# Patient Record
Sex: FEMALE | Race: WHITE | NOT HISPANIC OR LATINO | Employment: UNEMPLOYED | URBAN - METROPOLITAN AREA
[De-identification: names, ages, dates, MRNs, and addresses within clinical notes are randomized per-mention and may not be internally consistent; named-entity substitution may affect disease eponyms.]

---

## 2020-06-16 ENCOUNTER — HOSPITAL ENCOUNTER (OUTPATIENT)
Facility: MEDICAL CENTER | Age: 25
End: 2020-06-16
Attending: NURSE PRACTITIONER
Payer: MEDICAID

## 2020-06-16 ENCOUNTER — INITIAL PRENATAL (OUTPATIENT)
Dept: OBGYN | Facility: CLINIC | Age: 25
End: 2020-06-16
Payer: MEDICAID

## 2020-06-16 VITALS
BODY MASS INDEX: 25.87 KG/M2 | HEIGHT: 63 IN | WEIGHT: 146 LBS | SYSTOLIC BLOOD PRESSURE: 112 MMHG | DIASTOLIC BLOOD PRESSURE: 74 MMHG

## 2020-06-16 DIAGNOSIS — Z98.891 HISTORY OF CESAREAN DELIVERY: ICD-10-CM

## 2020-06-16 PROCEDURE — 87591 N.GONORRHOEAE DNA AMP PROB: CPT

## 2020-06-16 PROCEDURE — 88175 CYTOPATH C/V AUTO FLUID REDO: CPT

## 2020-06-16 PROCEDURE — 87491 CHLMYD TRACH DNA AMP PROBE: CPT

## 2020-06-16 PROCEDURE — 0500F INITIAL PRENATAL CARE VISIT: CPT | Performed by: NURSE PRACTITIONER

## 2020-06-16 SDOH — HEALTH STABILITY: MENTAL HEALTH: HOW OFTEN DO YOU HAVE A DRINK CONTAINING ALCOHOL?: NEVER

## 2020-06-16 ASSESSMENT — EDINBURGH POSTNATAL DEPRESSION SCALE (EPDS)
I HAVE BLAMED MYSELF UNNECESSARILY WHEN THINGS WENT WRONG: NOT VERY OFTEN
I HAVE FELT SAD OR MISERABLE: NOT VERY OFTEN
I HAVE BEEN ANXIOUS OR WORRIED FOR NO GOOD REASON: HARDLY EVER
THINGS HAVE BEEN GETTING ON TOP OF ME: NO, I HAVE BEEN COPING AS WELL AS EVER
THE THOUGHT OF HARMING MYSELF HAS OCCURRED TO ME: NEVER
I HAVE FELT SCARED OR PANICKY FOR NO GOOD REASON: NO, NOT MUCH
I HAVE BEEN SO UNHAPPY THAT I HAVE BEEN CRYING: ONLY OCCASIONALLY
I HAVE BEEN SO UNHAPPY THAT I HAVE HAD DIFFICULTY SLEEPING: NOT AT ALL

## 2020-06-16 NOTE — PROGRESS NOTES
"Pt. Here for NOB visit today.  # [phone]  First prenatal care  Pt. States she believes her baby \"stopped developing\"   Pharmacy verified  Pt would like AFP  Pt declines CF    Chaperone offered and not indicated      "

## 2020-06-16 NOTE — PROGRESS NOTES
Subjective:   Jessica Hoff is a 25 y.o.  who presents for her new OB exam.  She is 10w4d with an GORDON of Estimated Date of Delivery: 21 by LMP she was tracking. She is feeling well and has no concerns at this time. Denies VB, LOF, contractions or pain. No ER visits or previous care in this pregnancy. Denies dysuria, vaginal DC, fever. Reports no fetal movement. Desires AFP.  Declines CF.      Past Medical History:   Diagnosis Date   • Anemia    • GERD (gastroesophageal reflux disease)      Psych Hx: Patient denies any history of depression, anxiety, PTSD, bipolar or any other psychological issues.     Past Surgical History:   Procedure Laterality Date   • ABDOMINAL EXPLORATION       Cholecystectomy 2017   Hernia repair age 4    OB History    Para Term  AB Living   3 1 1   1 1   SAB TAB Ectopic Molar Multiple Live Births   1         1      # Outcome Date GA Lbr Tima/2nd Weight Sex Delivery Anes PTL Lv   3 Current            2 2018 14w0d    SAB      1 Term 04/02/15 40w0d  4.054 kg (8 lb 15 oz) M CS-LTranv  N TUCKER      Gynecological Hx: Denies any hx of STIs, including HSV. Denies any vulvovaginal disorders and no hx of abnormal cervical cytology. Last pap unknown.     Sexual Hx: One current male partner, who is FOB.     Contraceptive Hx: Has used nexplanon in the past and has since discontinued use.     Family History   Problem Relation Age of Onset   • No Known Problems Mother    • No Known Problems Father      Denies any genetic disorders in family history.     Social History     Socioeconomic History   • Marital status: Single     Spouse name: Not on file   • Number of children: Not on file   • Years of education: Not on file   • Highest education level: Not on file   Occupational History   • Not on file   Social Needs   • Financial resource strain: Not on file   • Food insecurity     Worry: Not on file     Inability: Not on file   • Transportation needs     Medical: Not on file      "Non-medical: Not on file   Tobacco Use   • Smoking status: Never Smoker   • Smokeless tobacco: Never Used   Substance and Sexual Activity   • Alcohol use: Not Currently     Frequency: Never   • Drug use: Not Currently   • Sexual activity: Yes     Partners: Male     Comment: None    Lifestyle   • Physical activity     Days per week: Not on file     Minutes per session: Not on file   • Stress: Not on file   Relationships   • Social connections     Talks on phone: Not on file     Gets together: Not on file     Attends Quaker service: Not on file     Active member of club or organization: Not on file     Attends meetings of clubs or organizations: Not on file     Relationship status: Not on file   • Intimate partner violence     Fear of current or ex partner: Not on file     Emotionally abused: Not on file     Physically abused: Not on file     Forced sexual activity: Not on file   Other Topics Concern   • Not on file   Social History Narrative   • Not on file       FOB is invovled and lives with Jessica Hoff.  Pregnancy is unplanned but desired.    She is currently working at [employer] from home, denies any heavy lifting or exposure to potential teratogens like environmental or occupational toxins.   Denies alcohol use, drug use, or tobacco use in pregnancy.   Denies any current or hx of sexual, emotional or physical abuse or trauma.     Current Medications: PNV   Allergies: Denies allergies to medications, food, or environmental allergies.     Objective:      Vitals:    06/16/20 0913   BP: 112/74   Weight: 66.2 kg (146 lb)   Height: 1.6 m (5' 3\")        See Prenatal Physical and Prenatal Vitals  UA WNL today    Assessment:      1.  IUP @ 10w4d per LMP      2.  S=D      3.  See problem list as follows     There are no active problems to display for this patient.        Plan:   - GC/CT & pap done today   - Op report requested  - Remedies for heartburn given   - Will think about C/s v. TOLAC  - Sleep remedies " and heartburn reviewed   - Prenatal labs ordered - lab slip given  - Discussed PNV, nutrition, adequate water intake, and exercise/weight gain in pregnancy  - NOB informational packet with anticipatory guidance given  - Information on Centering Pregnancy given, pt not appropriate   - S/sx of pregnancy warning signs and PTL precautions given  - Complete OB US in 10 wks  - Return to [facility] in 4 wks

## 2020-06-17 DIAGNOSIS — Z98.891 HISTORY OF CESAREAN DELIVERY: ICD-10-CM

## 2020-06-19 LAB
C TRACH DNA GENITAL QL NAA+PROBE: NEGATIVE
CYTOLOGY REG CYTOL: NORMAL
N GONORRHOEA DNA GENITAL QL NAA+PROBE: NEGATIVE
SPECIMEN SOURCE: NORMAL

## 2020-08-18 ENCOUNTER — HOSPITAL ENCOUNTER (OUTPATIENT)
Dept: LAB | Facility: MEDICAL CENTER | Age: 25
End: 2020-08-18
Attending: EMERGENCY MEDICINE
Payer: MEDICAID

## 2020-08-18 LAB
ABO GROUP BLD: NORMAL
BLD GP AB SCN SERPL QL: NORMAL
HBV SURFACE AG SER QL: NORMAL
HCT VFR BLD AUTO: 33 % (ref 37–47)
HCV AB SER QL: NORMAL
HGB BLD-MCNC: 11 G/DL (ref 12–16)
HIV 1+2 AB+HIV1 P24 AG SERPL QL IA: NORMAL
RH BLD: NORMAL
RUBV AB SER QL: 83.3 IU/ML
TREPONEMA PALLIDUM IGG+IGM AB [PRESENCE] IN SERUM OR PLASMA BY IMMUNOASSAY: NORMAL

## 2020-08-18 PROCEDURE — 81511 FTL CGEN ABNOR FOUR ANAL: CPT

## 2020-08-18 PROCEDURE — 36415 COLL VENOUS BLD VENIPUNCTURE: CPT

## 2020-08-18 PROCEDURE — 86762 RUBELLA ANTIBODY: CPT

## 2020-08-18 PROCEDURE — 85014 HEMATOCRIT: CPT

## 2020-08-18 PROCEDURE — 87389 HIV-1 AG W/HIV-1&-2 AB AG IA: CPT

## 2020-08-18 PROCEDURE — 86850 RBC ANTIBODY SCREEN: CPT

## 2020-08-18 PROCEDURE — 86803 HEPATITIS C AB TEST: CPT

## 2020-08-18 PROCEDURE — 87340 HEPATITIS B SURFACE AG IA: CPT

## 2020-08-18 PROCEDURE — 86780 TREPONEMA PALLIDUM: CPT

## 2020-08-18 PROCEDURE — 86900 BLOOD TYPING SEROLOGIC ABO: CPT

## 2020-08-18 PROCEDURE — 85018 HEMOGLOBIN: CPT

## 2020-08-18 PROCEDURE — 86901 BLOOD TYPING SEROLOGIC RH(D): CPT

## 2020-08-21 LAB
# FETUSES US: NORMAL
AFP MOM SERPL: 0.7
AFP SERPL-MCNC: 41 NG/ML
AGE - REPORTED: 26.7 YR
CURRENT SMOKER: NO
FAMILY MEMBER DISEASES HX: NO
GA METHOD: NORMAL
GA: NORMAL WK
HCG MOM SERPL: 1.26
HCG SERPL-ACNC: NORMAL IU/L
HX OF HEREDITARY DISORDERS: NO
IDDM PATIENT QL: NO
INHIBIN A MOM SERPL: 0.97
INHIBIN A SERPL-MCNC: 184 PG/ML
INTEGRATED SCN PATIENT-IMP: NORMAL
PATHOLOGY STUDY: NORMAL
SPECIMEN DRAWN SERPL: NORMAL
U ESTRIOL MOM SERPL: 0.8
U ESTRIOL SERPL-MCNC: 1.7 NG/ML

## 2020-11-03 ENCOUNTER — HOSPITAL ENCOUNTER (OUTPATIENT)
Dept: LAB | Facility: MEDICAL CENTER | Age: 25
End: 2020-11-03
Attending: SPECIALIST
Payer: MEDICAID

## 2020-11-03 LAB
GLUCOSE 1H P 50 G GLC PO SERPL-MCNC: 147 MG/DL (ref 70–139)
HCT VFR BLD AUTO: 29.5 % (ref 37–47)
HGB BLD-MCNC: 9.1 G/DL (ref 12–16)
TREPONEMA PALLIDUM IGG+IGM AB [PRESENCE] IN SERUM OR PLASMA BY IMMUNOASSAY: NORMAL

## 2020-11-03 PROCEDURE — 36415 COLL VENOUS BLD VENIPUNCTURE: CPT

## 2020-11-03 PROCEDURE — 85014 HEMATOCRIT: CPT

## 2020-11-03 PROCEDURE — 85018 HEMOGLOBIN: CPT

## 2020-11-03 PROCEDURE — 82950 GLUCOSE TEST: CPT

## 2020-11-03 PROCEDURE — 86780 TREPONEMA PALLIDUM: CPT

## 2020-11-28 ENCOUNTER — HOSPITAL ENCOUNTER (OUTPATIENT)
Dept: LAB | Facility: MEDICAL CENTER | Age: 25
End: 2020-11-28
Attending: SPECIALIST
Payer: MEDICAID

## 2020-11-28 LAB
GLUCOSE 1H P CHAL SERPL-MCNC: 139 MG/DL (ref 65–180)
GLUCOSE 2H P CHAL SERPL-MCNC: 125 MG/DL (ref 65–155)
GLUCOSE 3H P CHAL SERPL-MCNC: 117 MG/DL (ref 65–140)
GLUCOSE BS SERPL-MCNC: 88 MG/DL (ref 65–95)

## 2020-11-28 PROCEDURE — 36415 COLL VENOUS BLD VENIPUNCTURE: CPT

## 2020-11-28 PROCEDURE — 82951 GLUCOSE TOLERANCE TEST (GTT): CPT

## 2020-11-28 PROCEDURE — 82952 GTT-ADDED SAMPLES: CPT

## 2020-12-11 LAB — STREP GP B DNA PCR: NEGATIVE

## 2020-12-23 ENCOUNTER — PRE-ADMISSION TESTING (OUTPATIENT)
Dept: ADMISSIONS | Facility: MEDICAL CENTER | Age: 25
End: 2020-12-23
Attending: SPECIALIST
Payer: MEDICAID

## 2021-01-05 ENCOUNTER — HOSPITAL ENCOUNTER (INPATIENT)
Facility: MEDICAL CENTER | Age: 26
LOS: 2 days | End: 2021-01-07
Attending: SPECIALIST | Admitting: SPECIALIST
Payer: MEDICAID

## 2021-01-05 ENCOUNTER — ANESTHESIA EVENT (OUTPATIENT)
Dept: OBGYN | Facility: MEDICAL CENTER | Age: 26
End: 2021-01-05
Payer: MEDICAID

## 2021-01-05 ENCOUNTER — ANESTHESIA (OUTPATIENT)
Dept: OBGYN | Facility: MEDICAL CENTER | Age: 26
End: 2021-01-05
Payer: MEDICAID

## 2021-01-05 DIAGNOSIS — G89.18 POST-OP PAIN: ICD-10-CM

## 2021-01-05 LAB
APTT PPP: 32.2 SEC (ref 24.7–36)
BASOPHILS # BLD AUTO: 0.3 % (ref 0–1.8)
BASOPHILS # BLD: 0.04 K/UL (ref 0–0.12)
COVID ORDER STATUS COVID19: NORMAL
EOSINOPHIL # BLD AUTO: 0.19 K/UL (ref 0–0.51)
EOSINOPHIL NFR BLD: 1.5 % (ref 0–6.9)
ERYTHROCYTE [DISTWIDTH] IN BLOOD BY AUTOMATED COUNT: 61.5 FL (ref 35.9–50)
ERYTHROCYTE [DISTWIDTH] IN BLOOD BY AUTOMATED COUNT: 62.5 FL (ref 35.9–50)
HCT VFR BLD AUTO: 29.9 % (ref 37–47)
HCT VFR BLD AUTO: 31 % (ref 37–47)
HGB BLD-MCNC: 8.8 G/DL (ref 12–16)
HGB BLD-MCNC: 9.3 G/DL (ref 12–16)
HOLDING TUBE BB 8507: NORMAL
IMM GRANULOCYTES # BLD AUTO: 0.14 K/UL (ref 0–0.11)
IMM GRANULOCYTES NFR BLD AUTO: 1.1 % (ref 0–0.9)
INR PPP: 0.96 (ref 0.87–1.13)
LYMPHOCYTES # BLD AUTO: 1.78 K/UL (ref 1–4.8)
LYMPHOCYTES NFR BLD: 14.4 % (ref 22–41)
MCH RBC QN AUTO: 26.8 PG (ref 27–33)
MCH RBC QN AUTO: 27.7 PG (ref 27–33)
MCHC RBC AUTO-ENTMCNC: 29.4 G/DL (ref 33.6–35)
MCHC RBC AUTO-ENTMCNC: 30 G/DL (ref 33.6–35)
MCV RBC AUTO: 91.2 FL (ref 81.4–97.8)
MCV RBC AUTO: 92.3 FL (ref 81.4–97.8)
MONOCYTES # BLD AUTO: 0.8 K/UL (ref 0–0.85)
MONOCYTES NFR BLD AUTO: 6.5 % (ref 0–13.4)
NEUTROPHILS # BLD AUTO: 9.44 K/UL (ref 2–7.15)
NEUTROPHILS NFR BLD: 76.2 % (ref 44–72)
NRBC # BLD AUTO: 0.04 K/UL
NRBC BLD-RTO: 0.3 /100 WBC
PLATELET # BLD AUTO: 233 K/UL (ref 164–446)
PLATELET # BLD AUTO: 239 K/UL (ref 164–446)
PMV BLD AUTO: 11.8 FL (ref 9–12.9)
PMV BLD AUTO: 11.8 FL (ref 9–12.9)
PROTHROMBIN TIME: 13.1 SEC (ref 12–14.6)
RBC # BLD AUTO: 3.28 M/UL (ref 4.2–5.4)
RBC # BLD AUTO: 3.36 M/UL (ref 4.2–5.4)
SARS-COV+SARS-COV-2 AG RESP QL IA.RAPID: NOTDETECTED
SPECIMEN SOURCE: NORMAL
WBC # BLD AUTO: 12.4 K/UL (ref 4.8–10.8)
WBC # BLD AUTO: 18.7 K/UL (ref 4.8–10.8)

## 2021-01-05 PROCEDURE — C9803 HOPD COVID-19 SPEC COLLECT: HCPCS | Performed by: SPECIALIST

## 2021-01-05 PROCEDURE — 160048 HCHG OR STATISTICAL LEVEL 1-5: Performed by: SPECIALIST

## 2021-01-05 PROCEDURE — 85730 THROMBOPLASTIN TIME PARTIAL: CPT

## 2021-01-05 PROCEDURE — 160009 HCHG ANES TIME/MIN: Performed by: SPECIALIST

## 2021-01-05 PROCEDURE — 85025 COMPLETE CBC W/AUTO DIFF WBC: CPT

## 2021-01-05 PROCEDURE — 700102 HCHG RX REV CODE 250 W/ 637 OVERRIDE(OP): Performed by: ANESTHESIOLOGY

## 2021-01-05 PROCEDURE — 85610 PROTHROMBIN TIME: CPT

## 2021-01-05 PROCEDURE — 700111 HCHG RX REV CODE 636 W/ 250 OVERRIDE (IP): Performed by: ANESTHESIOLOGY

## 2021-01-05 PROCEDURE — 700105 HCHG RX REV CODE 258: Performed by: SPECIALIST

## 2021-01-05 PROCEDURE — 160029 HCHG SURGERY MINUTES - 1ST 30 MINS LEVEL 4: Performed by: SPECIALIST

## 2021-01-05 PROCEDURE — 700105 HCHG RX REV CODE 258: Performed by: ANESTHESIOLOGY

## 2021-01-05 PROCEDURE — 87426 SARSCOV CORONAVIRUS AG IA: CPT

## 2021-01-05 PROCEDURE — 36415 COLL VENOUS BLD VENIPUNCTURE: CPT

## 2021-01-05 PROCEDURE — 770002 HCHG ROOM/CARE - OB PRIVATE (112)

## 2021-01-05 PROCEDURE — A9270 NON-COVERED ITEM OR SERVICE: HCPCS | Performed by: ANESTHESIOLOGY

## 2021-01-05 PROCEDURE — 700111 HCHG RX REV CODE 636 W/ 250 OVERRIDE (IP): Performed by: SPECIALIST

## 2021-01-05 PROCEDURE — 160002 HCHG RECOVERY MINUTES (STAT): Performed by: SPECIALIST

## 2021-01-05 PROCEDURE — 700101 HCHG RX REV CODE 250: Performed by: ANESTHESIOLOGY

## 2021-01-05 PROCEDURE — 85027 COMPLETE CBC AUTOMATED: CPT

## 2021-01-05 PROCEDURE — 160035 HCHG PACU - 1ST 60 MINS PHASE I: Performed by: SPECIALIST

## 2021-01-05 PROCEDURE — 160041 HCHG SURGERY MINUTES - EA ADDL 1 MIN LEVEL 4: Performed by: SPECIALIST

## 2021-01-05 RX ORDER — SODIUM CHLORIDE, SODIUM LACTATE, POTASSIUM CHLORIDE, CALCIUM CHLORIDE 600; 310; 30; 20 MG/100ML; MG/100ML; MG/100ML; MG/100ML
INJECTION, SOLUTION INTRAVENOUS CONTINUOUS
Status: DISCONTINUED | OUTPATIENT
Start: 2021-01-05 | End: 2021-01-07 | Stop reason: HOSPADM

## 2021-01-05 RX ORDER — OXYCODONE HCL 5 MG/5 ML
5 SOLUTION, ORAL ORAL
Status: DISCONTINUED | OUTPATIENT
Start: 2021-01-05 | End: 2021-01-05 | Stop reason: HOSPADM

## 2021-01-05 RX ORDER — HYDROMORPHONE HYDROCHLORIDE 1 MG/ML
0.2 INJECTION, SOLUTION INTRAMUSCULAR; INTRAVENOUS; SUBCUTANEOUS
Status: DISPENSED | OUTPATIENT
Start: 2021-01-05 | End: 2021-01-06

## 2021-01-05 RX ORDER — SODIUM CHLORIDE, SODIUM LACTATE, POTASSIUM CHLORIDE, CALCIUM CHLORIDE 600; 310; 30; 20 MG/100ML; MG/100ML; MG/100ML; MG/100ML
INJECTION, SOLUTION INTRAVENOUS CONTINUOUS
Status: DISCONTINUED | OUTPATIENT
Start: 2021-01-05 | End: 2021-01-05 | Stop reason: HOSPADM

## 2021-01-05 RX ORDER — KETOROLAC TROMETHAMINE 30 MG/ML
30 INJECTION, SOLUTION INTRAMUSCULAR; INTRAVENOUS EVERY 6 HOURS
Status: DISPENSED | OUTPATIENT
Start: 2021-01-05 | End: 2021-01-06

## 2021-01-05 RX ORDER — ONDANSETRON 2 MG/ML
INJECTION INTRAMUSCULAR; INTRAVENOUS PRN
Status: DISCONTINUED | OUTPATIENT
Start: 2021-01-05 | End: 2021-01-05 | Stop reason: SURG

## 2021-01-05 RX ORDER — SIMETHICONE 80 MG
80 TABLET,CHEWABLE ORAL 4 TIMES DAILY PRN
Status: DISCONTINUED | OUTPATIENT
Start: 2021-01-05 | End: 2021-01-07 | Stop reason: HOSPADM

## 2021-01-05 RX ORDER — PHENYLEPHRINE HCL IN 0.9% NACL 0.5 MG/5ML
SYRINGE (ML) INTRAVENOUS PRN
Status: DISCONTINUED | OUTPATIENT
Start: 2021-01-05 | End: 2021-01-05 | Stop reason: SURG

## 2021-01-05 RX ORDER — CEFAZOLIN SODIUM 1 G/3ML
INJECTION, POWDER, FOR SOLUTION INTRAMUSCULAR; INTRAVENOUS PRN
Status: DISCONTINUED | OUTPATIENT
Start: 2021-01-05 | End: 2021-01-05 | Stop reason: SURG

## 2021-01-05 RX ORDER — OXYTOCIN 10 [USP'U]/ML
INJECTION, SOLUTION INTRAMUSCULAR; INTRAVENOUS PRN
Status: DISCONTINUED | OUTPATIENT
Start: 2021-01-05 | End: 2021-01-05 | Stop reason: SURG

## 2021-01-05 RX ORDER — CITRIC ACID/SODIUM CITRATE 334-500MG
30 SOLUTION, ORAL ORAL ONCE
Status: COMPLETED | OUTPATIENT
Start: 2021-01-05 | End: 2021-01-05

## 2021-01-05 RX ORDER — KETOROLAC TROMETHAMINE 30 MG/ML
30 INJECTION, SOLUTION INTRAMUSCULAR; INTRAVENOUS EVERY 6 HOURS
Status: CANCELLED | OUTPATIENT
Start: 2021-01-05 | End: 2021-01-06

## 2021-01-05 RX ORDER — HYDROMORPHONE HYDROCHLORIDE 1 MG/ML
0.1 INJECTION, SOLUTION INTRAMUSCULAR; INTRAVENOUS; SUBCUTANEOUS
Status: DISCONTINUED | OUTPATIENT
Start: 2021-01-05 | End: 2021-01-05 | Stop reason: HOSPADM

## 2021-01-05 RX ORDER — KETOROLAC TROMETHAMINE 30 MG/ML
INJECTION, SOLUTION INTRAMUSCULAR; INTRAVENOUS PRN
Status: DISCONTINUED | OUTPATIENT
Start: 2021-01-05 | End: 2021-01-05 | Stop reason: SURG

## 2021-01-05 RX ORDER — ACETAMINOPHEN 500 MG
1000 TABLET ORAL EVERY 6 HOURS
Status: COMPLETED | OUTPATIENT
Start: 2021-01-05 | End: 2021-01-06

## 2021-01-05 RX ORDER — LABETALOL HYDROCHLORIDE 5 MG/ML
5 INJECTION, SOLUTION INTRAVENOUS
Status: DISCONTINUED | OUTPATIENT
Start: 2021-01-05 | End: 2021-01-05 | Stop reason: HOSPADM

## 2021-01-05 RX ORDER — METOCLOPRAMIDE HYDROCHLORIDE 5 MG/ML
10 INJECTION INTRAMUSCULAR; INTRAVENOUS ONCE
Status: COMPLETED | OUTPATIENT
Start: 2021-01-05 | End: 2021-01-05

## 2021-01-05 RX ORDER — OXYCODONE HYDROCHLORIDE 5 MG/1
5 TABLET ORAL EVERY 4 HOURS PRN
Status: ACTIVE | OUTPATIENT
Start: 2021-01-05 | End: 2021-01-06

## 2021-01-05 RX ORDER — MORPHINE SULFATE 0.5 MG/ML
INJECTION, SOLUTION EPIDURAL; INTRATHECAL; INTRAVENOUS
Status: COMPLETED | OUTPATIENT
Start: 2021-01-05 | End: 2021-01-05

## 2021-01-05 RX ORDER — SODIUM CHLORIDE, SODIUM GLUCONATE, SODIUM ACETATE, POTASSIUM CHLORIDE AND MAGNESIUM CHLORIDE 526; 502; 368; 37; 30 MG/100ML; MG/100ML; MG/100ML; MG/100ML; MG/100ML
INJECTION, SOLUTION INTRAVENOUS
Status: DISCONTINUED | OUTPATIENT
Start: 2021-01-05 | End: 2021-01-05 | Stop reason: SURG

## 2021-01-05 RX ORDER — HALOPERIDOL 5 MG/ML
1 INJECTION INTRAMUSCULAR
Status: DISCONTINUED | OUTPATIENT
Start: 2021-01-05 | End: 2021-01-05 | Stop reason: HOSPADM

## 2021-01-05 RX ORDER — SODIUM CHLORIDE, SODIUM GLUCONATE, SODIUM ACETATE, POTASSIUM CHLORIDE AND MAGNESIUM CHLORIDE 526; 502; 368; 37; 30 MG/100ML; MG/100ML; MG/100ML; MG/100ML; MG/100ML
1500 INJECTION, SOLUTION INTRAVENOUS ONCE
Status: COMPLETED | OUTPATIENT
Start: 2021-01-05 | End: 2021-01-05

## 2021-01-05 RX ORDER — HYDROMORPHONE HYDROCHLORIDE 1 MG/ML
0.4 INJECTION, SOLUTION INTRAMUSCULAR; INTRAVENOUS; SUBCUTANEOUS
Status: DISCONTINUED | OUTPATIENT
Start: 2021-01-05 | End: 2021-01-05 | Stop reason: HOSPADM

## 2021-01-05 RX ORDER — DEXAMETHASONE SODIUM PHOSPHATE 4 MG/ML
INJECTION, SOLUTION INTRA-ARTICULAR; INTRALESIONAL; INTRAMUSCULAR; INTRAVENOUS; SOFT TISSUE PRN
Status: DISCONTINUED | OUTPATIENT
Start: 2021-01-05 | End: 2021-01-05 | Stop reason: SURG

## 2021-01-05 RX ORDER — HYDROMORPHONE HYDROCHLORIDE 1 MG/ML
0.2 INJECTION, SOLUTION INTRAMUSCULAR; INTRAVENOUS; SUBCUTANEOUS
Status: DISCONTINUED | OUTPATIENT
Start: 2021-01-05 | End: 2021-01-05 | Stop reason: HOSPADM

## 2021-01-05 RX ORDER — ONDANSETRON 2 MG/ML
4 INJECTION INTRAMUSCULAR; INTRAVENOUS EVERY 6 HOURS PRN
Status: DISPENSED | OUTPATIENT
Start: 2021-01-05 | End: 2021-01-06

## 2021-01-05 RX ORDER — DIPHENHYDRAMINE HYDROCHLORIDE 50 MG/ML
12.5 INJECTION INTRAMUSCULAR; INTRAVENOUS EVERY 6 HOURS PRN
Status: ACTIVE | OUTPATIENT
Start: 2021-01-05 | End: 2021-01-06

## 2021-01-05 RX ORDER — OXYCODONE HCL 5 MG/5 ML
10 SOLUTION, ORAL ORAL
Status: DISCONTINUED | OUTPATIENT
Start: 2021-01-05 | End: 2021-01-05 | Stop reason: HOSPADM

## 2021-01-05 RX ORDER — DOCUSATE SODIUM 100 MG/1
100 CAPSULE, LIQUID FILLED ORAL 2 TIMES DAILY PRN
Status: DISCONTINUED | OUTPATIENT
Start: 2021-01-05 | End: 2021-01-07 | Stop reason: HOSPADM

## 2021-01-05 RX ORDER — OXYCODONE HYDROCHLORIDE 10 MG/1
10 TABLET ORAL EVERY 4 HOURS PRN
Status: DISPENSED | OUTPATIENT
Start: 2021-01-05 | End: 2021-01-06

## 2021-01-05 RX ORDER — SODIUM CHLORIDE, SODIUM LACTATE, POTASSIUM CHLORIDE, CALCIUM CHLORIDE 600; 310; 30; 20 MG/100ML; MG/100ML; MG/100ML; MG/100ML
INJECTION, SOLUTION INTRAVENOUS PRN
Status: DISCONTINUED | OUTPATIENT
Start: 2021-01-05 | End: 2021-01-07 | Stop reason: HOSPADM

## 2021-01-05 RX ORDER — METHYLERGONOVINE MALEATE 0.2 MG/ML
0.2 INJECTION INTRAVENOUS
Status: DISCONTINUED | OUTPATIENT
Start: 2021-01-05 | End: 2021-01-07 | Stop reason: HOSPADM

## 2021-01-05 RX ORDER — BUPIVACAINE HYDROCHLORIDE 7.5 MG/ML
INJECTION, SOLUTION INTRASPINAL
Status: COMPLETED | OUTPATIENT
Start: 2021-01-05 | End: 2021-01-05

## 2021-01-05 RX ORDER — DIPHENHYDRAMINE HYDROCHLORIDE 50 MG/ML
12.5 INJECTION INTRAMUSCULAR; INTRAVENOUS
Status: DISCONTINUED | OUTPATIENT
Start: 2021-01-05 | End: 2021-01-05 | Stop reason: HOSPADM

## 2021-01-05 RX ADMIN — SODIUM CHLORIDE, POTASSIUM CHLORIDE, SODIUM LACTATE AND CALCIUM CHLORIDE: 600; 310; 30; 20 INJECTION, SOLUTION INTRAVENOUS at 17:00

## 2021-01-05 RX ADMIN — HYDROMORPHONE HYDROCHLORIDE 0.2 MG: 1 INJECTION, SOLUTION INTRAMUSCULAR; INTRAVENOUS; SUBCUTANEOUS at 17:10

## 2021-01-05 RX ADMIN — KETOROLAC TROMETHAMINE 30 MG: 30 INJECTION, SOLUTION INTRAMUSCULAR at 20:20

## 2021-01-05 RX ADMIN — OXYTOCIN 20 UNITS: 10 INJECTION, SOLUTION INTRAMUSCULAR; INTRAVENOUS at 13:04

## 2021-01-05 RX ADMIN — Medication 100 MCG: at 13:10

## 2021-01-05 RX ADMIN — Medication 200 MCG: at 13:37

## 2021-01-05 RX ADMIN — ONDANSETRON 4 MG: 2 INJECTION INTRAMUSCULAR; INTRAVENOUS at 17:10

## 2021-01-05 RX ADMIN — ACETAMINOPHEN 1000 MG: 500 TABLET ORAL at 20:19

## 2021-01-05 RX ADMIN — CEFAZOLIN 2 G: 330 INJECTION, POWDER, FOR SOLUTION INTRAMUSCULAR; INTRAVENOUS at 12:30

## 2021-01-05 RX ADMIN — Medication 100 MCG: at 13:29

## 2021-01-05 RX ADMIN — Medication 200 MCG: at 12:52

## 2021-01-05 RX ADMIN — Medication 200 MCG: at 12:47

## 2021-01-05 RX ADMIN — BUPIVACAINE HYDROCHLORIDE IN DEXTROSE 1.3 ML: 7.5 INJECTION, SOLUTION SUBARACHNOID at 12:10

## 2021-01-05 RX ADMIN — METOCLOPRAMIDE 10 MG: 5 INJECTION, SOLUTION INTRAMUSCULAR; INTRAVENOUS at 11:58

## 2021-01-05 RX ADMIN — SODIUM CHLORIDE, POTASSIUM CHLORIDE, SODIUM LACTATE AND CALCIUM CHLORIDE: 600; 310; 30; 20 INJECTION, SOLUTION INTRAVENOUS at 17:22

## 2021-01-05 RX ADMIN — ONDANSETRON 8 MG: 2 INJECTION INTRAMUSCULAR; INTRAVENOUS at 13:05

## 2021-01-05 RX ADMIN — SODIUM CHLORIDE, SODIUM GLUCONATE, SODIUM ACETATE, POTASSIUM CHLORIDE AND MAGNESIUM CHLORIDE: 526; 502; 368; 37; 30 INJECTION, SOLUTION INTRAVENOUS at 13:03

## 2021-01-05 RX ADMIN — Medication 100 MCG: at 12:42

## 2021-01-05 RX ADMIN — Medication 100 MCG: at 12:38

## 2021-01-05 RX ADMIN — FENTANYL CITRATE 15 MCG: 50 INJECTION, SOLUTION INTRAMUSCULAR; INTRAVENOUS at 12:10

## 2021-01-05 RX ADMIN — Medication 100 MCG: at 13:02

## 2021-01-05 RX ADMIN — SODIUM CHLORIDE, SODIUM GLUCONATE, SODIUM ACETATE, POTASSIUM CHLORIDE AND MAGNESIUM CHLORIDE 1500 ML: 526; 502; 368; 37; 30 INJECTION, SOLUTION INTRAVENOUS at 10:50

## 2021-01-05 RX ADMIN — Medication 200 MCG: at 13:44

## 2021-01-05 RX ADMIN — Medication 200 MCG: at 12:30

## 2021-01-05 RX ADMIN — Medication 200 MCG: at 13:19

## 2021-01-05 RX ADMIN — SODIUM CITRATE AND CITRIC ACID MONOHYDRATE 30 ML: 500; 334 SOLUTION ORAL at 11:58

## 2021-01-05 RX ADMIN — MORPHINE SULFATE 100 MCG: 0.5 INJECTION, SOLUTION EPIDURAL; INTRATHECAL; INTRAVENOUS at 12:10

## 2021-01-05 RX ADMIN — FAMOTIDINE 20 MG: 10 INJECTION INTRAVENOUS at 11:58

## 2021-01-05 RX ADMIN — KETOROLAC TROMETHAMINE 30 MG: 30 INJECTION, SOLUTION INTRAMUSCULAR at 13:27

## 2021-01-05 RX ADMIN — SODIUM CHLORIDE, SODIUM GLUCONATE, SODIUM ACETATE, POTASSIUM CHLORIDE AND MAGNESIUM CHLORIDE: 526; 502; 368; 37; 30 INJECTION, SOLUTION INTRAVENOUS at 12:05

## 2021-01-05 RX ADMIN — Medication 200 MCG: at 13:05

## 2021-01-05 RX ADMIN — DEXAMETHASONE SODIUM PHOSPHATE 10 MG: 4 INJECTION, SOLUTION INTRA-ARTICULAR; INTRALESIONAL; INTRAMUSCULAR; INTRAVENOUS; SOFT TISSUE at 13:05

## 2021-01-05 RX ADMIN — OXYTOCIN 125 ML/HR: 10 INJECTION, SOLUTION INTRAMUSCULAR; INTRAVENOUS at 14:39

## 2021-01-05 SDOH — ECONOMIC STABILITY: TRANSPORTATION INSECURITY
IN THE PAST 12 MONTHS, HAS THE LACK OF TRANSPORTATION KEPT YOU FROM MEDICAL APPOINTMENTS OR FROM GETTING MEDICATIONS?: NO

## 2021-01-05 SDOH — ECONOMIC STABILITY: FOOD INSECURITY: WITHIN THE PAST 12 MONTHS, YOU WORRIED THAT YOUR FOOD WOULD RUN OUT BEFORE YOU GOT MONEY TO BUY MORE.: NEVER TRUE

## 2021-01-05 SDOH — ECONOMIC STABILITY: TRANSPORTATION INSECURITY
IN THE PAST 12 MONTHS, HAS LACK OF TRANSPORTATION KEPT YOU FROM MEETINGS, WORK, OR FROM GETTING THINGS NEEDED FOR DAILY LIVING?: NO

## 2021-01-05 SDOH — ECONOMIC STABILITY: FOOD INSECURITY: WITHIN THE PAST 12 MONTHS, THE FOOD YOU BOUGHT JUST DIDN'T LAST AND YOU DIDN'T HAVE MONEY TO GET MORE.: NEVER TRUE

## 2021-01-05 ASSESSMENT — COPD QUESTIONNAIRES
DURING THE PAST 4 WEEKS HOW MUCH DID YOU FEEL SHORT OF BREATH: NONE/LITTLE OF THE TIME
HAVE YOU SMOKED AT LEAST 100 CIGARETTES IN YOUR ENTIRE LIFE: NO/DON'T KNOW
DO YOU EVER COUGH UP ANY MUCUS OR PHLEGM?: NO/ONLY WITH OCCASIONAL COLDS OR INFECTIONS
IN THE PAST 12 MONTHS DO YOU DO LESS THAN YOU USED TO BECAUSE OF YOUR BREATHING PROBLEMS: DISAGREE/UNSURE
COPD SCREENING SCORE: 0

## 2021-01-05 ASSESSMENT — EDINBURGH POSTNATAL DEPRESSION SCALE (EPDS)
I HAVE FELT SCARED OR PANICKY FOR NO GOOD REASON: NO, NOT AT ALL
I HAVE BEEN SO UNHAPPY THAT I HAVE HAD DIFFICULTY SLEEPING: NOT AT ALL
I HAVE FELT SAD OR MISERABLE: NO, NOT AT ALL
I HAVE LOOKED FORWARD WITH ENJOYMENT TO THINGS: AS MUCH AS I EVER DID
THINGS HAVE BEEN GETTING ON TOP OF ME: NO, I HAVE BEEN COPING AS WELL AS EVER
I HAVE BLAMED MYSELF UNNECESSARILY WHEN THINGS WENT WRONG: YES, SOME OF THE TIME
I HAVE BEEN ANXIOUS OR WORRIED FOR NO GOOD REASON: HARDLY EVER
I HAVE BEEN SO UNHAPPY THAT I HAVE BEEN CRYING: NO, NEVER
THE THOUGHT OF HARMING MYSELF HAS OCCURRED TO ME: NEVER
I HAVE BEEN ABLE TO LAUGH AND SEE THE FUNNY SIDE OF THINGS: AS MUCH AS I ALWAYS COULD

## 2021-01-05 ASSESSMENT — PAIN SCALES - GENERAL
PAINLEVEL: 0 - NO PAIN
PAIN_LEVEL: 0

## 2021-01-05 ASSESSMENT — PAIN DESCRIPTION - PAIN TYPE
TYPE: SURGICAL PAIN

## 2021-01-05 ASSESSMENT — PATIENT HEALTH QUESTIONNAIRE - PHQ9
1. LITTLE INTEREST OR PLEASURE IN DOING THINGS: NOT AT ALL
SUM OF ALL RESPONSES TO PHQ9 QUESTIONS 1 AND 2: 0
2. FEELING DOWN, DEPRESSED, IRRITABLE, OR HOPELESS: NOT AT ALL

## 2021-01-05 ASSESSMENT — LIFESTYLE VARIABLES
ALCOHOL_USE: NO
EVER_SMOKED: NEVER

## 2021-01-05 NOTE — PROGRESS NOTES
Bedside report received from Ros (L&D RN); patient oriented to room including emergency/regular use of call light, when to call RN, and plan of care for the rest of the shift. Fundus firm -3 U with light bleeding and two medium clots, oxytocin running at 125 ml/hr and will continue to monitor; infant assessment complete, ID bands verified, cuddles blinking

## 2021-01-05 NOTE — PROGRESS NOTES
EDC - 2021 EGA - 39.0    1027 - Pt arrived to labor and delivery for Repeat . Pt placed in room LDA5. External monitors in place X2. Category I FHT at this time. VSS. Pt reports good FM. No complaints of contractions, ROM or vaginal bleeding. FOB at bedside. POC discussed with pt and family members, all questions answered. Pt prepped for surgery  1045 Covid swab obtained  1050 IV started, labs drawn  1202 Pt ambulated to OR 2 in stable condition at this time  1349 Pt transferred to PACU 3 in stable condition via gurney  1450 Pt transferred to PP in stable condition via r[place]on with infant in arms. Report given to Thu DAY. Infant bands matched x2 cuddles active.

## 2021-01-05 NOTE — OP REPORT
DATE OF SERVICE:  2021     PREOPERATIVE DIAGNOSES:  1.  Intrauterine pregnancy at 39 weeks and 0 days gestation.  2.  Previous  section and the patient wishes to be delivered via   repeat  section.     POSTOPERATIVE DIAGNOSES:  1.  Intrauterine pregnancy at 39 weeks and 0 days gestation.  2.  Previous  section and the patient wishes to be delivered via   repeat  section.  3.  Live term male  with Apgar scores of 8 and 8 at 1 and 5 minutes   respectively and a  weight of 3840 grams.     PROCEDURE:  Repeat low transverse  section.     SURGEON:  Sinan Sutton MD     ASSISTANT:  Niurka Soler MD     ANESTHESIA:  Spinal.     ANESTHESIOLOGIST:  Marcy Caba MD     FINDINGS:  1.  The uterus appears normal.  2.  There are adhesions of the omentum to the anterior abdominal wall.  3.  Live term male  with Apgar scores of 8 and 8 at 1 and 5 minutes   respectively and a  weight of 3840 grams.     SPECIMENS:  None.     COMPLICATIONS:  None.     ESTIMATED BLOOD LOSS:  Approximately 600 mL.     DESCRIPTION OF PROCEDURE:  After the appropriate consents have been obtained,   the patient was taken to the operating room and given spinal anesthesia.  She   was prepped and draped in the dorsal supine position and a Reyna catheter was   noted to be in place and draining urine.  Anesthesia was tested and noted to   be excellent.  The lower abdominal horizontal surgical scar (Pfannenstiel   scar) was identified and then incision was made through this scar   (Pfannenstiel incision) using the scalpel.  This incision was continued deeply   through subcutaneous tissues using Bovie electrocautery and hemostasis was   maintained with Bovie electrocautery.  The anterior rectus fascia was   identified and incised transversely with Bovie electrocautery and this   incision extended bilaterally with Bovie electrocautery.  The superior aspect   of the fascial  incision was doubly grasped with Kocher clamps and undermined   from the underlying rectus muscle with both blunt dissection and Bovie   electrocautery.  Next, inferior aspect of the fascial incision was similarly   doubly grasped with Kocher clamps and undermined from the underlying rectus   muscle with both blunt dissection and Bovie electrocautery.  The midline of   the rectus muscles identified and the rectus muscle was grasped on either side   of the midline with Kellie clamps and these Kellie clamps were elevated and the   midline of the rectus muscle was incised vertically between the two clamps   with the Bovie electrocautery.  This allowed entry through the parietal   peritoneum.  The incision in the rectus muscle was then extended superiorly   and inferiorly with Bovie electrocautery and then the incision in the parietal   peritoneum is extended superiorly and inferiorly with care taken to avoid the   bladder.  The Hayes O self-retaining retractor was inserted and set up in   the usual fashion and found to provide excellent exposure to the anterior   lower uterine segment.  The serosa overlying the segment was grasped and   incised with Metzenbaum scissors and this incision extended bilaterally with   Metzenbaum scissors.  Bladder flap was created with blunt dissection.  The   anterior lower uterine segment was incised transversely with the scalpel and   bulging membranes were seen.  The hysterotomy was extended bilaterally.    Rupture of membranes reveals clear fluid.  A hand was placed in the   intrauterine cavity around baby's head and fundal pressure was used to easily   deliver baby's head.  Baby's nasopharynx was suctioned with a bulb syringe.    Continued fundal pressure was used to easily deliver baby's body.  Baby's   nasopharynx was further suctioned with a bulb syringe as the umbilical cord   was doubly clamped and cut and then baby was handed to the waiting nursery   team.  The placenta was  manually removed.  The interior of the uterus was   wiped clean of products of conception.  The cervix was dilated with a pair of   ring forceps and this pair of ring forceps was then discarded.  The   hysterotomy was reapproximated first with the placement of no less than 3   interrupted mattress sutures of 1 chromic and a continuous interlocking suture   of 1 chromic.  The entire length of the hysterotomy repair was examined and   hemostasis was noted to be excellent.  The Hayes O self-retaining retractor   was removed.  Lap and needle counts were reported to be correct at this time.    Adhesions of the omentum at the anterior abdominal wall were lysed with Bovie   electrocautery and hemostasis was noted to be excellent.  The parietal   peritoneum was reapproximated with simple continuous suture using 3-0 Vicryl.    The rectus muscle was reapproximated in the midline with the placement of no   less than 3 interrupted mattress sutures of 2-0 chromic.  Hemostasis was noted   to be excellent.  The anterior rectus fascia was identified and   reapproximated with simple continuous suture using 1 Vicryl.  The wound was   copiously irrigated and drained and hemostasis noted to be excellent.  The   skin is undermined superiorly and inferiorly in the usual fashion.    Subcutaneous tissues were reapproximated with simple continuous suture using   3-0 Vicryl.  Finally, the skin was reapproximated with placement of many   interrupted buried sutures of 4-0 Monocryl placed in the dermis and at least 1   such suture was placed for every 1 cm of length along the entire length of   the skin incision.  The skin incision was thus reapproximated nicely in this   way.  Procedure was terminated.  Final lap and needle counts reported to be   correct x2 at the end of the procedure.  The patient tolerated the procedure   well and sent to postanesthesia recovery in stable condition.        ______________________________  Sinan Sutton  MD GE/ANIVAL    DD:  01/05/2021 15:18  DT:  01/05/2021 15:50    Job#:  [id number]    CC:MD Marcy Roy MD

## 2021-01-05 NOTE — CARE PLAN
Problem: Communication  Goal: The ability to communicate needs accurately and effectively will improve  Outcome: PROGRESSING AS EXPECTED  Note: Plan of care reviewed including provider roles, health history, IV and medications, labs and tests, and discharge planning. Patient assured that they may ask questions at any time and should always let staff know if they are having difficulty breathing, pain or any discomfort at any time. Patient oriented to room ,call light, telephone, side rails and over bed table. Patient/SO instructed to keep bed in the low position with wheels locked.      Problem: Altered physiologic condition related to postoperative  delivery  Goal: Patient physiologically stable as evidenced by normal lochia, palpable uterine involution and vital signs within normal limits  Outcome: PROGRESSING AS EXPECTED  Note: Fundus firm, bleeding light, oxytocin running, no pain

## 2021-01-05 NOTE — ANESTHESIA PREPROCEDURE EVALUATION
24yo  at 39 weeks here for elective C section; has hx of emergent Csection under GA due to fetal intolerance    Relevant Problems   OB   (+) History of  delivery       Physical Exam    Airway   Mallampati: II  TM distance: >3 FB  Neck ROM: full       Cardiovascular - normal exam  Rhythm: regular  Rate: normal  (-) murmur     Dental - normal exam           Pulmonary - normal exam  Breath sounds clear to auscultation     Abdominal    Neurological - normal exam                 Anesthesia Plan    ASA 2       Plan - spinal   Neuraxial block will be primary anesthetic            Postoperative Plan: Postoperative administration of opioids is intended.    Pertinent diagnostic labs and testing reviewed    Informed Consent:    Anesthetic plan and risks discussed with patient.

## 2021-01-05 NOTE — ANESTHESIA TIME REPORT
Anesthesia Start and Stop Event Times     Date Time Event    2021 1125 Ready for Procedure     1205 Anesthesia Start     1358 Anesthesia Stop        Responsible Staff  21    Name Role Begin End    Marcy Caba M.D. Anesth 1205 1358        Preop Diagnosis (Free Text):  Pre-op Diagnosis     PREVIOUS CAESAREAN SECTION   39 WEEKS        Preop Diagnosis (Codes):  Diagnosis Information     Diagnosis Code(s):  delivery delivered [O82]        Post op Diagnosis  History of  section      Premium Reason  Non-Premium    Comments:

## 2021-01-05 NOTE — OR SURGEON
Immediate Post OP Note    PreOp Diagnosis:   1.)  Intrauterine pregnancy at 39 weeks and 0 days gestation.  2.)  Previous  section and the patient wishes to be delivered via repeat  section.    PostOp Diagnosis:   1.)  Intrauterine pregnancy at 39 weeks and 0 days gestation.  2.)  Previous  section and the patient wishes to be delivered via repeat  section.  3.)  Live term male  with Apgar scores of 8 and 8 at 1 and 5 minutes respectively and  weight of 3840 grams    Procedure(s):   SECTION, REPEAT - Wound Class: Clean Contaminated    Surgeon(s):  PEDRO Clemons M.D.    Anesthesiologist/Type of Anesthesia:  Anesthesiologist: Marcy Caba M.D./Spinal    Surgical Staff:  Circulator: Ros Torre R.N.  Scrub Person: Katerina Zhang  L&D Baby  Nurse: Shonna Colbert R.N.; Cathy Stewart R.N.    Specimens removed if any:  None    Estimated Blood Loss:   Approximately 600 cc    Findings:   Normal uterus.  Some adhesions of the omentum to the anterior abdominal wall.  Live term male  with Apgar scores of 8 and 8 at 1 and 5 minutes respectively and  weight of 3840 grams    Complications:   None.        2021 1:45 PM Sinan Sutton M.D.

## 2021-01-05 NOTE — ANESTHESIA POSTPROCEDURE EVALUATION
Patient: Jessica Hoff    Procedure Summary     Date: 21 Room / Location: [facility] OR 02 / LABOR AND DELIVERY    Anesthesia Start: 1205 Anesthesia Stop: 1358    Procedure:  SECTION, REPEAT (Bilateral Abdomen) Diagnosis:        delivery delivered      ( section delivered)    Surgeons: Sinan Sutton M.D. Responsible Provider: Marcy Caba M.D.    Anesthesia Type: spinal ASA Status: 2          Final Anesthesia Type: spinal  Last vitals  BP   Blood Pressure: 112/83    Temp   36.6 °C (97.8 °F)    Pulse   Pulse: (!) 101   Resp   18    SpO2   96 %      Anesthesia Post Evaluation    Patient location during evaluation: PACU  Patient participation: complete - patient participated  Level of consciousness: awake and alert  Pain score: 0    Airway patency: patent  Anesthetic complications: no  Cardiovascular status: hemodynamically stable  Respiratory status: acceptable  Hydration status: euvolemic    PONV: none    patient able to participate, but full recovery from regional anesthesia has not occurred and is not expected within the stipulated timeframe for the completion of the evaluation       Nurse Pain Score: 0 (NPRS)

## 2021-01-05 NOTE — ANESTHESIA PROCEDURE NOTES
Spinal Block    Date/Time: 1/5/2021 12:10 PM  Performed by: Marcy Caba M.D.  Authorized by: Marcy Caba M.D.     Start Time:  1/5/2021 12:10 PM  End Time:  1/5/2021 12:28 PM  Reason for Block: primary anesthetic    patient identified, IV checked, site marked, risks and benefits discussed, surgical consent, monitors and equipment checked, pre-op evaluation and timeout performed    Patient Position:  Sitting  Prep: ChloraPrep, patient draped and sterile technique    Monitoring:  Blood pressure, continuous pulse oximetry and heart rate  Approach:  Midline  Location:  L3-4  Injection Technique:  Single-shot  Skin infiltration:  Lidocaine  Strength:  1%  Dose:  5ml  Needle Type:  Pencan  Needle Gauge:  25 G  CSF flowing pre/post injection:  Yes  Sensory Level:  T4   First attempt and first pass got good pop with L sided paresthesia but no CSF. A few more passes at that level with multiple good pops (no paresthesias this time) and still no CSF.  Attempted to dose and waited 5 minutes but no block developed, so repreppred and went up a level; again good pop with L sided paresthesia but no CSF, even with aspiration.,  Waited a minute and finally had slow CSF in needle, so bolused normally.  Block set up great

## 2021-01-05 NOTE — H&P
Jessica Hoff          YOB: 1995  Date of today's surgery: 2021  Facility: [facility] Labor and Delivery    ID: The patient is a very pleasant 25-year-old prima Paro ( numeral 3, para numeral 1) who is today 39 weeks and 0 days gestation.    Chief Complaint: The patient says that she has no complaints other than for generalized discomfort consistent with term pregnancy.    History of Present Illness: The patient has had prenatal care with myself during the course of this pregnancy.  Her pregnancy has been complicated by the fact that she has had 1 previous  section.  On 2015 she delivered via primary  section (in [place], [place], at 40 weeks gestation and she was delivered of a live term male  weighing 8 pounds and 15 ounces at birth.  For a time she did express interest in having a vaginal birth after previous  section.  Subsequently she changed her mind and decided that she wishes to proceed with repeat  section.  She is scheduled today for repeat  section.  Of note she has seen Maritza myself during the course of this pregnancy but she has also been seen by perinatology ([facility]).    Past Medical History: The patient has had no medical illnesses other than for anemia.  On November 3, 2020 her hemoglobin and hematocrit were 9.1 g/dL and 29.5% respectively.    Past Surgical History: The patient has had 1 previous  section and she has had a cholecystectomy and she has had a hernia repair.    Medications: The patient takes no medications other than for vitamins and iron sulfate.    Allergies: The patient says that she has no known drug allergies.    Social History: The patient denies smoking.  She denies consuming alcoholic beverages.  She denies the use of recreational drugs.    Review of Systems  General: The patient denies any fevers, chills, sweats.  Pulmonary:  The patient denies any coughing, wheezing, chest pain, shortness of breath.  Cardiovascular: The patient denies any palpitations, dyspnea, chest pain.  Gastrointestinal: The patient denies any nausea, vomiting, diarrhea, constipation, hematochezia, melena, history of hepatitis, history of jaundice.  Genitourinary: The patient says that she continues to feel fetal movement throughout the day every day.  She denies any leakage of fluid per vagina.  She denies vaginal bleeding.  Musculoskeletal: The patient denies any arthralgias or myalgias other than for hip pain and low back pain.   Neurological: No headaches or syncope or seizures.     Physical Exam:   Vital Signs: The patient's vital signs are stable and she is afebrile.  General: The patient appears well developed and well nourished and relaxed and alert and comfortable and in no apparent distress.    HEENT :  Normo-cephalic, atraumatic, pupils equal, round, reactive to light and accommodation, extra ocular motions intact, pharynx clear; there is no thyromegaly. There is no cervical lymphadenopathy.  Chest: Heart regular rate and rhythm, with no murmurs or rubs or gallops; the lungs are clear to auscultation bilaterally.  Abdomen: The abdomen is gravid and is about 9 months size.  There is a Pfannenstiel scar.   Pelvic: The cervix is long and closed and high.  Extremities: No clubbing or cyanosis or edema except for perhaps mild bilateral symmetrical lower extremity edema consistent with term pregnancy.   Neurological: non-focal.     Assessment:   1.)  Intrauterine pregnancy at 39 weeks and 0 days gestation.  2.)  Previous  section and the patient wishes to be delivered via repeat  section.    Plan:   I have discussed with the patient in detail and at length what repeat  section is and what repeat  section involves and I have discussed with her and explained to her in detail and at length the risks and benefits and alternatives of  repeat  section.  After our discussions and after answering her questions she told her that she very much wishes for us to proceed with repeat  section.            ________________________  Sinan Sutton M.D.

## 2021-01-06 LAB
ERYTHROCYTE [DISTWIDTH] IN BLOOD BY AUTOMATED COUNT: 63 FL (ref 35.9–50)
HCT VFR BLD AUTO: 26.4 % (ref 37–47)
HGB BLD-MCNC: 8 G/DL (ref 12–16)
MCH RBC QN AUTO: 27.8 PG (ref 27–33)
MCHC RBC AUTO-ENTMCNC: 30.3 G/DL (ref 33.6–35)
MCV RBC AUTO: 91.7 FL (ref 81.4–97.8)
PLATELET # BLD AUTO: 230 K/UL (ref 164–446)
PMV BLD AUTO: 11.8 FL (ref 9–12.9)
RBC # BLD AUTO: 2.88 M/UL (ref 4.2–5.4)
WBC # BLD AUTO: 18.3 K/UL (ref 4.8–10.8)

## 2021-01-06 PROCEDURE — A9270 NON-COVERED ITEM OR SERVICE: HCPCS | Performed by: SPECIALIST

## 2021-01-06 PROCEDURE — A9270 NON-COVERED ITEM OR SERVICE: HCPCS | Performed by: ANESTHESIOLOGY

## 2021-01-06 PROCEDURE — 700102 HCHG RX REV CODE 250 W/ 637 OVERRIDE(OP): Performed by: SPECIALIST

## 2021-01-06 PROCEDURE — 36415 COLL VENOUS BLD VENIPUNCTURE: CPT

## 2021-01-06 PROCEDURE — 700111 HCHG RX REV CODE 636 W/ 250 OVERRIDE (IP): Performed by: ANESTHESIOLOGY

## 2021-01-06 PROCEDURE — 85027 COMPLETE CBC AUTOMATED: CPT

## 2021-01-06 PROCEDURE — 770002 HCHG ROOM/CARE - OB PRIVATE (112)

## 2021-01-06 PROCEDURE — 700102 HCHG RX REV CODE 250 W/ 637 OVERRIDE(OP): Performed by: ANESTHESIOLOGY

## 2021-01-06 RX ORDER — OXYCODONE AND ACETAMINOPHEN 10; 325 MG/1; MG/1
1 TABLET ORAL EVERY 4 HOURS PRN
Status: DISCONTINUED | OUTPATIENT
Start: 2021-01-06 | End: 2021-01-07 | Stop reason: HOSPADM

## 2021-01-06 RX ORDER — ACETAMINOPHEN 325 MG/1
325 TABLET ORAL EVERY 4 HOURS PRN
Status: DISCONTINUED | OUTPATIENT
Start: 2021-01-06 | End: 2021-01-07 | Stop reason: HOSPADM

## 2021-01-06 RX ORDER — MORPHINE SULFATE 10 MG/ML
4 INJECTION, SOLUTION INTRAMUSCULAR; INTRAVENOUS
Status: DISCONTINUED | OUTPATIENT
Start: 2021-01-06 | End: 2021-01-07 | Stop reason: HOSPADM

## 2021-01-06 RX ORDER — OXYCODONE HYDROCHLORIDE AND ACETAMINOPHEN 5; 325 MG/1; MG/1
1 TABLET ORAL EVERY 4 HOURS PRN
Status: DISCONTINUED | OUTPATIENT
Start: 2021-01-06 | End: 2021-01-07 | Stop reason: HOSPADM

## 2021-01-06 RX ORDER — ONDANSETRON 2 MG/ML
4 INJECTION INTRAMUSCULAR; INTRAVENOUS EVERY 6 HOURS PRN
Status: DISCONTINUED | OUTPATIENT
Start: 2021-01-06 | End: 2021-01-07 | Stop reason: HOSPADM

## 2021-01-06 RX ORDER — IBUPROFEN 600 MG/1
600 TABLET ORAL EVERY 6 HOURS PRN
Status: DISCONTINUED | OUTPATIENT
Start: 2021-01-06 | End: 2021-01-07 | Stop reason: HOSPADM

## 2021-01-06 RX ORDER — ONDANSETRON 4 MG/1
4 TABLET, ORALLY DISINTEGRATING ORAL EVERY 6 HOURS PRN
Status: DISCONTINUED | OUTPATIENT
Start: 2021-01-06 | End: 2021-01-07 | Stop reason: HOSPADM

## 2021-01-06 RX ADMIN — KETOROLAC TROMETHAMINE 30 MG: 30 INJECTION, SOLUTION INTRAMUSCULAR at 10:00

## 2021-01-06 RX ADMIN — ACETAMINOPHEN 1000 MG: 500 TABLET ORAL at 14:14

## 2021-01-06 RX ADMIN — IBUPROFEN 600 MG: 600 TABLET, FILM COATED ORAL at 21:45

## 2021-01-06 RX ADMIN — KETOROLAC TROMETHAMINE 30 MG: 30 INJECTION, SOLUTION INTRAMUSCULAR at 01:56

## 2021-01-06 RX ADMIN — ACETAMINOPHEN 1000 MG: 500 TABLET ORAL at 08:40

## 2021-01-06 RX ADMIN — ACETAMINOPHEN 1000 MG: 500 TABLET ORAL at 01:56

## 2021-01-06 ASSESSMENT — PAIN DESCRIPTION - PAIN TYPE
TYPE: ACUTE PAIN;SURGICAL PAIN
TYPE: ACUTE PAIN

## 2021-01-06 ASSESSMENT — PATIENT HEALTH QUESTIONNAIRE - PHQ9
1. LITTLE INTEREST OR PLEASURE IN DOING THINGS: NOT AT ALL
2. FEELING DOWN, DEPRESSED, IRRITABLE, OR HOPELESS: NOT AT ALL
SUM OF ALL RESPONSES TO PHQ9 QUESTIONS 1 AND 2: 0

## 2021-01-06 NOTE — PROGRESS NOTES
0700-- Received report from SHAY Fleming, Infant at bedside in open crib no signs of distress.  Pt resting in bed. Discussed pain management for the day.  No further needs at the time.  Call light within reach, bed locked and in lowest position.  Rounding in place.    0830-- Assessment completed, VSS, Pt declines PRN pain medication but given scheduled medications at this time.  Discussed plan of care for the day that pt is comfortable with.  All questions answered at this time.  Will continue to monitor.

## 2021-01-06 NOTE — PROGRESS NOTES
2000 Received awake on bed with on going IVF of LR in progress, Reyna catheter in progress, with sequential stocking bilaterally, Assessment done with mild abdominal pain  Scheduled medicine given as orders, Encourage early mobilization, Encourage to call for assistance, Needs attended.

## 2021-01-06 NOTE — PROGRESS NOTES
Jessica is today postoperative day number one status post repeat  section. She says that following her  section yesterday she was experiencing significant abdominal soreness. She says that today, however, her soreness is much less. She says that earlier this morning the Reyna catheter was removed and she says that subsequently she was able to empty her bladder without any difficulty. She is tolerating regular diet. She says that when she ambulated this morning she was not experiencing any weakness or dizziness. Of note yesterday, after her  section. She continue to have uterine bleeding and she tells me this morning that eventually this bleeding stopped and she says that this morning she is no longer having any bleeding.  The patient’s vital signs are stable and she is afebrile.  The patient appears well developed and well-nourished and relaxed and alert and comfortable and in no apparent distress.  The patient’s preoperative hemoglobin yesterday morning at 10:50 AM was 9.3 g/dL. Her hemoglobin yesterday evening had decreased 8.8 g/dL. Of note there are no labs for this morning.  Yesterday evening her INR was normal at 9.6 and her PT was normal at 13.1 seconds and her PTT was normal at 32.2 seconds.  We will continue ambulation and give analgesia as needed and repeat labs today.  I explained to the patient that labs reveal that she is anemic and that she was anemic just before her  section and that her anemia has not according to the lab’s worsened that much.  Sinan Sutton M.D.

## 2021-01-06 NOTE — LACTATION NOTE
This note was copied from a baby's chart.  Mother is currently doing skin to skin, and is not feeling too well. She has latched this infant, and denies pain or pinching with latch. Reviewed frequency of feedings, when she can expect her milk to come in, and to work on deep latch to also include areola.     Plan is to breastfeed with cues, no more than 3-4 hours from last feeding, at least 8 or more feedings in a 24 hour period.     Encouraged mother to call for support as needed.

## 2021-01-06 NOTE — PROGRESS NOTES
Patient passing multiple medium size blood clots, fundus boggy with small trickle on assessment, but able to massage firm; pads weighed in at 250, MD notified of bleeding and frequent vomiting, per MD okay to give Zofran early, hang LR at 125 ml/hr and draw stat labs

## 2021-01-06 NOTE — DISCHARGE PLANNING
Discharge Planning Assessment Post Partum     Reason for Referral: History of THC prior to pregnancy  Address: [address] [facility] [address] [place] [address]  Phone: [phone]  Type of Living Situation: living with FOB  Mom Diagnosis: Pregnancy,   Baby Diagnosis: -39 weeks  Primary Language: English     Name of Baby: Scott Rockwell (: 21)  Father of the Baby: Jim Rockwell  Involved in baby’s care? Yes  Contact Information: [phone]     Prenatal Care: Yes  PCP for new baby:Pediatrician list provided to mother     Support System: FOB  Coping/Bonding between mother & baby: Yes  Source of Feeding: breast feeding  Supplies for Infant: prepared for infant     Mom's Insurance: [place]  Baby Covered on Insurance:Yes  Mother Employed/School: [employer]   Other children in the home/names & ages: 5 year old son     Financial Hardship/Income: denies   Mom's Mental status: alert and oriented  Services used prior to admit: none     CPS History: No  Psychiatric History: No  Domestic Violence History: No  Drug/ETOH History: history of THC prior to pregnancy.  Infant's UDS is negative.     Resources Provided: pediatrician list and post partum support and counseling resources provided  Referrals Made: none      Clearance for Discharge: Infant is cleared to discharge home with parents

## 2021-01-07 ENCOUNTER — PHARMACY VISIT (OUTPATIENT)
Dept: PHARMACY | Facility: MEDICAL CENTER | Age: 26
End: 2021-01-07
Payer: COMMERCIAL

## 2021-01-07 VITALS
TEMPERATURE: 98.2 F | HEART RATE: 81 BPM | BODY MASS INDEX: 33.12 KG/M2 | DIASTOLIC BLOOD PRESSURE: 55 MMHG | SYSTOLIC BLOOD PRESSURE: 110 MMHG | OXYGEN SATURATION: 97 % | RESPIRATION RATE: 17 BRPM | WEIGHT: 194 LBS | HEIGHT: 64 IN

## 2021-01-07 PROBLEM — O34.219 PREVIOUS CESAREAN SECTION COMPLICATING PREGNANCY: Status: ACTIVE | Noted: 2020-06-16

## 2021-01-07 PROCEDURE — 700102 HCHG RX REV CODE 250 W/ 637 OVERRIDE(OP): Performed by: SPECIALIST

## 2021-01-07 PROCEDURE — RXMED WILLOW AMBULATORY MEDICATION CHARGE: Performed by: SPECIALIST

## 2021-01-07 PROCEDURE — A9270 NON-COVERED ITEM OR SERVICE: HCPCS | Performed by: SPECIALIST

## 2021-01-07 RX ORDER — IBUPROFEN 800 MG/1
800 TABLET ORAL EVERY 8 HOURS PRN
Qty: 30 TAB | Refills: 0 | Status: SHIPPED | OUTPATIENT
Start: 2021-01-07

## 2021-01-07 RX ORDER — OXYCODONE HYDROCHLORIDE AND ACETAMINOPHEN 5; 325 MG/1; MG/1
1 TABLET ORAL EVERY 6 HOURS PRN
Qty: 28 TAB | Refills: 0 | Status: SHIPPED | OUTPATIENT
Start: 2021-01-07 | End: 2021-01-14

## 2021-01-07 RX ADMIN — OXYCODONE AND ACETAMINOPHEN 1 TABLET: 5; 325 TABLET ORAL at 05:56

## 2021-01-07 RX ADMIN — OXYCODONE HYDROCHLORIDE AND ACETAMINOPHEN 1 TABLET: 10; 325 TABLET ORAL at 14:40

## 2021-01-07 RX ADMIN — OXYCODONE HYDROCHLORIDE AND ACETAMINOPHEN 1 TABLET: 10; 325 TABLET ORAL at 10:06

## 2021-01-07 RX ADMIN — IBUPROFEN 600 MG: 600 TABLET, FILM COATED ORAL at 12:01

## 2021-01-07 RX ADMIN — OXYCODONE AND ACETAMINOPHEN 1 TABLET: 5; 325 TABLET ORAL at 00:47

## 2021-01-07 RX ADMIN — DOCUSATE SODIUM 100 MG: 100 CAPSULE, LIQUID FILLED ORAL at 10:06

## 2021-01-07 RX ADMIN — IBUPROFEN 600 MG: 600 TABLET, FILM COATED ORAL at 05:56

## 2021-01-07 ASSESSMENT — PAIN DESCRIPTION - PAIN TYPE
TYPE: SURGICAL PAIN;ACUTE PAIN
TYPE: SURGICAL PAIN
TYPE: SURGICAL PAIN

## 2021-01-07 NOTE — PROGRESS NOTES
POST OP DAY # 2  The patient says that other than for some soreness she feels fine and has no problems or complaints.   She says that she has been ambulating and urinating and tolerating a regular diet and passing flatus.   She says that she wants to go home today.   The patient's vital signs are stable and she is afebrile. She appears well developed and well nourished and relaxed and alert and comfortable and in no apparent distress.   We will discharge the patient home today with Rx's for percocet and ibuprofen and iron sulfate and stool softener.   She will follow up with me in the office in about one week for a post op visit and I asked her to call or contact me at any time should she ever have any problems or questions or complaints and she said that she would do so.   Sinan Sutton M.D.

## 2021-01-07 NOTE — CARE PLAN
Problem: Safety  Goal: Will remain free from falls  Outcome: PROGRESSING AS EXPECTED  Note: Bed locked and in lowest position. Call light within reach. Emergency call lights discussed with patient.     Problem: Alteration in comfort related to surgical incision and/or after birth pains  Goal: Patient is able to ambulate, care for self and infant with acceptable pain level  Outcome: PROGRESSING AS EXPECTED  Note: Patient able to ambulate without difficulty and perform john care independently. Pain management discussed with patient and patient given PRN ibuprofen (see MAR). Patient able to care for infant independently and breastfeeds comfortably. Encouraged mom to call if needing assistance.

## 2021-01-07 NOTE — LACTATION NOTE
This note was copied from a baby's chart.  Follow-up visit, baby 39 weeks, baby is 1 day & 23 hours old, C/S, MOB has no breastfeeding risk factors. MOB is experienced,  1st baby for 16 months with good supply. MOB has baby STS and reports baby just finished breastfeeding- latch not seen. Discussed breastfeeding on first breast then offering second breast after each feeding. Encouraged MOB to have nurse/LC assess latch with next feed. Nipples intact and everted, denies nipple damage or pain with latches.     Teaching reviewed on hunger cues, breastfeeding when baby shows cues no longer than 4 hours from last feed, importance of STS & cluster feeding as normal behavior.     KAITY has Medicaid, she declines referral for WIC at this time.     Breastfeeding plan:  Breastfeed on cue a minimum 8x/24 hours no longer than 4 hours from last feed.

## 2021-01-07 NOTE — PROGRESS NOTES
"1900 - Received report from SHAY Bennett. Assumed care of pt. Plan of care discussed.    Assessment complete. Fundus firm and palpable, lochia light rubra. Pain management and interventions discussed with pt. Pt. State pain 7/10 at incision site and with some cramping from breastfeeding. Brought mom a cold pack and discussed medication interventions available at this time. Patient states she likes to \"stay away from medications if possible\" but will take an ibuprofen at this time d/t cramping severely bothering her. Infant breastfeeding well and mom able to latch infant independently. Patient states \"I feel a clot coming out\" and ambulates up to restroom to perform john care. Apricot clot sized clot passed. Bleeding still light and patient still firm. Asked patient to inform RN if another clot is passed and to have RN assess it before flushing it; patient verbalized understanding. All questions and concerns discussed at this time. No further needs. Encouraged pt. To call with needs. Will continue to monitor.   "

## 2021-01-07 NOTE — DISCHARGE INSTRUCTIONS
POSTPARTUM DISCHARGE INSTRUCTIONS FOR MOM    YOB: 1995   Age: 25 y.o.               Admit Date: 2021     Discharge Date: 2021  Attending Doctor:  Sinan Sutton M.D.                  Allergies:  Patient has no known allergies.    Discharged to home by car. Discharged via wheelchair, hospital escort: Yes.  Special equipment needed: Not Applicable  Belongings with: Personal  Be sure to schedule a follow-up appointment with your primary care doctor or any specialists as instructed.     Discharge Plan:   Diet Plan: Discussed  Activity Level: Discussed  Confirmed Follow up Appointment: Patient to Call and Schedule Appointment  Confirmed Symptoms Management: Discussed  Medication Reconciliation Updated: Yes  Influenza Vaccine Indication: Patient Refuses    REASONS TO CALL YOUR OBSTETRICIAN:  1.   Persistent fever or shaking chills (Temperature higher than 100.4)  2.   Heavy bleeding (soaking more than 1 pad per hour); Passing clots  3.   Foul odor from vagina  4.   Mastitis (Breast infection; breast pain, chills, fever, redness)  5.   Urinary pain, burning or frequency  7.   Abdominal incision infection  8.   Severe depression longer than 24 hours    HAND WASHING  · Prior to handling the baby.  · Before breastfeeding or bottle feeding baby.  · After using the bathroom or changing the baby's diaper.    WOUND CARE  Ask your physician for additional care instructions.  In general:  ·  Incision:      · Keep clean and dry.    · Do NOT lift anything heavier than your baby for up to 6 weeks.    · There should not be any opening or pus.      VAGINAL CARE  · Nothing inside vagina for 6 weeks: no sexual intercourse, tampons or douching.  · Bleeding may continue for 2-4 weeks.  Amount may vary.    · Call your physician for heavy bleeding which means soaking more than 1 pad per hour    BIRTH CONTROL  · It is possible to become pregnant at any time after delivery and while breastfeeding.  · Plan to discuss  "a method of birth control with your physician at your follow up visit. visit.    DIET AND ELIMINATION  · Eating more fiber (bran cereal, fruits, and vegetables) and drinking plenty of fluids will help to avoid constipation.  · Urinary frequency after childbirth is normal.    POSTPARTUM BLUES  During the first few days after birth, you may experience a sense of the \"blues\" which may include impatience, irritability or even crying.  These feeling come and go quickly.  However, as many as 1 in 10 women experience emotional symptoms known as postpartum depression.  Postpartum depression:  May start as early as the second or third day after delivery or take several weeks or months to develop.  Symptoms of \"blues\" are present, but are more intense:  Crying spells; loss of appetite; feelings of hopelessness or loss of control; fear of touching the baby; over concern or no concern at all about the baby; little or no concern about your own appearance/caring for yourself; and/or inability to sleep or excessive sleeping.  Contact your physician if you are experiencing any of these symptoms.  Crisis Hotline:  · [place] Crisis Hotline:  3-936-GSNDAUL  Or 1-854.951.4368  · [place] Crisis Hotline:  1-657.147.6576  Or 456-432-2147    PREVENTING SHAKEN BABY:  If you are angry or stressed, PUT THE BABY IN THE CRIB, step away, take some deep breaths, and wait until you are calm to care for the baby.  DO NOT SHAKE THE BABY.  You are not alone, call a supporter for help.  · Crisis Call Center 24/7 crisis line 166-085-4864 or 1-527.563.6985  · You can also text them, text \"ANSWER\" to 102176    QUIT SMOKING/TOBACCO USE:  I understand the use of any tobacco products increases my chance of suffering from future heart disease and could cause other illnesses which may shorten my life. Quitting the use of tobacco products is the single most important thing I can do to improve my health. For further information on smoking / tobacco cessation " call a Toll Free Quit Line at 1-379.228.3505 (*National Cancer [place]) or 1-582.825.5447 (American Lung Association) or you can access the web based program at www.lungusa.org.  · [place] Tobacco Users Help Line:  (979) 226-9916       Toll Free: 1-624.408.6375  · Quit Tobacco Program [facility] (564)453-2580    DEPRESSION / SUICIDE RISK:  As you are discharged from this [facility], it is important to learn how to keep safe from harming yourself.    Recognize the warning signs:  · Abrupt changes in personality, positive or negative- including increase in energy   · Giving away possessions  · Change in eating patterns- significant weight changes-  positive or negative  · Change in sleeping patterns- unable to sleep or sleeping all the time   · Unwillingness or inability to communicate  · Depression  · Unusual sadness, discouragement and loneliness  · Talk of wanting to die  · Neglect of personal appearance   · Rebelliousness- reckless behavior  · Withdrawal from people/activities they love  · Confusion- inability to concentrate     If you or a loved one observes any of these behaviors or has concerns about self-harm, here's what you can do:  · Talk about it- your feelings and reasons for harming yourself  · Remove any means that you might use to hurt yourself (examples: pills, rope, extension cords, firearm)  · Get professional help from the community (Mental Health, Substance Abuse, psychological counseling)  · Do not be alone:Call your Safe Contact- someone whom you trust who will be there for you.  · Call your local CRISIS HOTLINE 098-1728 or 038-229-7947  · Call your local Children's Mobile Crisis Response Team [place] (702) 878-0189 or www.Bloompop  · Call the toll free National Suicide Prevention Hotlines   · National Suicide Prevention Lifeline 850-681-XYIK (3597)  · National Hope Line Network 800-SUICIDE (664-2660)    DISCHARGE SURVEY:  Thank you for choosing  [facility].  We hope we provided you with very good care.  You may be receiving a survey in the mail.  Please fill it out.  Your opinion is valuable to us.  My signature on this form indicates that:  1.  I have reviewed and understand the above information  2.  My questions regarding this information have been answered to my satisfaction.  3.  I have formulated a plan with my discharge nurse to obtain my prescribed medication for home.

## 2021-01-08 NOTE — DISCHARGE PLANNING
Meds-to-Beds: Discharge prescription orders listed below delivered to patient's bedside. RN notified. Patient counseled. Patient presented valid photo ID for percocet prescription.       Kavya, Jessica Pinky   Home Medication Instructions PEPPER:[id number]    Printed on:01/07/21 7643   Medication Information                      ibuprofen (MOTRIN) 800 MG Tab  Take 1 Tab by mouth every 8 hours as needed for Mild Pain or Moderate Pain.             oxyCODONE-acetaminophen (PERCOCET) 5-325 MG Tab  Take 1 Tab by mouth every 6 hours as needed for Moderate Pain or Severe Pain for up to 7 days.               Dominique Bishop, PharmD

## 2021-01-08 NOTE — PROGRESS NOTES
1322- Bedside report received from SHAY Bah.  Assumed care of patient.  Patient denied needs.  1640- Patient assessment done.  1730- Discharge instructions reviewed with patient who verbalized understanding and stated she has no questions.  Patient stated she received her discharge medications from the pharmacist.  1800- Patient stated that she is ready for discharge.  Patient discharged to home, no change noted in condition, via wheelchair with infant and FOB.

## 2021-02-16 NOTE — DISCHARGE SUMMARY
DATE OF ADMISSION:  2021   DATE OF DISCHARGE:  2021     ADMISSION DIAGNOSES:    1.  Intrauterine pregnancy at 39 weeks and 0 days gestation.  2.  Previous  section.  The patient wished to be delivered via repeat    section.     DISCHARGE DIAGNOSES:  1.  Intrauterine pregnancy at 39 weeks and 0 days gestation.  2.  Previous  section.  The patient wished to be delivered via repeat    section.  3.  Live term male  with Apgar scores of 8 and 8 at 1 and 5 minutes   respectively and a  weight of 3840 grams     PROCEDURE:  Repeat low transverse  section.     COMPLICATIONS:  None.     HOSPITAL COURSE:  The patient was admitted to [facility] on 2021 with the aforementioned admission diagnoses   and on that day underwent a repeat low transverse  section and was   delivered of a live term male  with Apgar scores of 8 and 8 at 1 and 5   minutes respectively and a  weight of 3840 grams.  The procedure was   without complications.  The patient's postoperative course was uncomplicated.    On postoperative day #1, the patient said that she felt some soreness and   felt fine otherwise and that day after Reyna catheter was removed, she was   able to urinate and she tolerated a regular diet and she ambulated and was not   experiencing any weakness.  Of note, she did postoperatively experienced some   uterine bleeding but eventually this bleeding stopped.  On postoperative day   #1, the patient's vital signs were stable and she was afebrile and she   appeared well developed and well nourished and relaxed and alert and   comfortable and in no apparent distress.  It was noted on postoperative day #1   that the patient's preoperative hemoglobin had been 9.3 g/dL and her   hemoglobin in the evening of the date of admission was 8.8 g/dL and her INR   was normal.  Her PT was normal and her PTT was normal.   The next day,   01/07/2021 postoperative day #2, the patient said that other than for some   soreness, she felt fine and had no problems or complaints and she was   ambulating and urinating and tolerating a regular diet and passing flatus and   said that she wished to go home on that day.  Her vital signs were stable and   she was afebrile.  She appeared well developed and well nourished and relaxed   and alert and comfortable and in no apparent distress.  She was discharged   home on postoperative day #2 with prescriptions for Percocet and ibuprofen,   iron sulfate and stool softener.  I asked her to followup with me in the   office in about 1 week for postoperative visit.  I asked her to call or   contact me at any time should she ever have any problems or questions or   complaints and she said that she would do so.     Of note, her hemoglobin decreased from 9.3 g/dL to 8.8 g/dL to 8.0 g/dL.        ______________________________  MD LUMA Montano/LUKE    DD:  02/15/2021 23:38  DT:  02/16/2021 00:27    Job#:  [id number]

## 2022-09-07 ENCOUNTER — APPOINTMENT (OUTPATIENT)
Dept: ADMISSIONS | Facility: MEDICAL CENTER | Age: 27
End: 2022-09-07
Payer: MEDICAID

## 2022-09-08 ENCOUNTER — ANESTHESIA EVENT (OUTPATIENT)
Dept: OBGYN | Facility: MEDICAL CENTER | Age: 27
End: 2022-09-08
Payer: MEDICAID

## 2022-09-09 ENCOUNTER — PRE-ADMISSION TESTING (OUTPATIENT)
Dept: ADMISSIONS | Facility: MEDICAL CENTER | Age: 27
End: 2022-09-09
Attending: SPECIALIST
Payer: MEDICAID

## 2022-09-10 ENCOUNTER — ANESTHESIA (OUTPATIENT)
Dept: OBGYN | Facility: MEDICAL CENTER | Age: 27
End: 2022-09-10
Payer: MEDICAID

## 2022-09-10 ENCOUNTER — HOSPITAL ENCOUNTER (INPATIENT)
Facility: MEDICAL CENTER | Age: 27
LOS: 2 days | End: 2022-09-12
Attending: SPECIALIST | Admitting: SPECIALIST
Payer: MEDICAID

## 2022-09-10 DIAGNOSIS — G89.18 POST-OP PAIN: ICD-10-CM

## 2022-09-10 DIAGNOSIS — K59.01 CONSTIPATION BY DELAYED COLONIC TRANSIT: ICD-10-CM

## 2022-09-10 LAB
ABO GROUP BLD: NORMAL
BASOPHILS # BLD AUTO: 0.3 % (ref 0–1.8)
BASOPHILS # BLD: 0.04 K/UL (ref 0–0.12)
BLD GP AB SCN SERPL QL: NORMAL
EOSINOPHIL # BLD AUTO: 0.18 K/UL (ref 0–0.51)
EOSINOPHIL NFR BLD: 1.4 % (ref 0–6.9)
ERYTHROCYTE [DISTWIDTH] IN BLOOD BY AUTOMATED COUNT: 52.7 FL (ref 35.9–50)
ERYTHROCYTE [DISTWIDTH] IN BLOOD BY AUTOMATED COUNT: 53.1 FL (ref 35.9–50)
HCT VFR BLD AUTO: 28.4 % (ref 37–47)
HCT VFR BLD AUTO: 31.8 % (ref 37–47)
HGB BLD-MCNC: 8.8 G/DL (ref 12–16)
HGB BLD-MCNC: 9.7 G/DL (ref 12–16)
HOLDING TUBE BB 8507: NORMAL
IMM GRANULOCYTES # BLD AUTO: 0.13 K/UL (ref 0–0.11)
IMM GRANULOCYTES NFR BLD AUTO: 1 % (ref 0–0.9)
INR PPP: 0.99 (ref 0.87–1.13)
LYMPHOCYTES # BLD AUTO: 2.14 K/UL (ref 1–4.8)
LYMPHOCYTES NFR BLD: 16.8 % (ref 22–41)
MCH RBC QN AUTO: 26.8 PG (ref 27–33)
MCH RBC QN AUTO: 26.8 PG (ref 27–33)
MCHC RBC AUTO-ENTMCNC: 30.5 G/DL (ref 33.6–35)
MCHC RBC AUTO-ENTMCNC: 31 G/DL (ref 33.6–35)
MCV RBC AUTO: 86.6 FL (ref 81.4–97.8)
MCV RBC AUTO: 87.8 FL (ref 81.4–97.8)
MONOCYTES # BLD AUTO: 0.87 K/UL (ref 0–0.85)
MONOCYTES NFR BLD AUTO: 6.8 % (ref 0–13.4)
NEUTROPHILS # BLD AUTO: 9.35 K/UL (ref 2–7.15)
NEUTROPHILS NFR BLD: 73.7 % (ref 44–72)
NRBC # BLD AUTO: 0.07 K/UL
NRBC BLD-RTO: 0.6 /100 WBC
PLATELET # BLD AUTO: 210 K/UL (ref 164–446)
PLATELET # BLD AUTO: 228 K/UL (ref 164–446)
PMV BLD AUTO: 11.5 FL (ref 9–12.9)
PMV BLD AUTO: 11.7 FL (ref 9–12.9)
PROTHROMBIN TIME: 13 SEC (ref 12–14.6)
RBC # BLD AUTO: 3.28 M/UL (ref 4.2–5.4)
RBC # BLD AUTO: 3.62 M/UL (ref 4.2–5.4)
RH BLD: NORMAL
WBC # BLD AUTO: 12.7 K/UL (ref 4.8–10.8)
WBC # BLD AUTO: 17.7 K/UL (ref 4.8–10.8)

## 2022-09-10 PROCEDURE — 700101 HCHG RX REV CODE 250: Performed by: ANESTHESIOLOGY

## 2022-09-10 PROCEDURE — 160009 HCHG ANES TIME/MIN: Performed by: SPECIALIST

## 2022-09-10 PROCEDURE — 700101 HCHG RX REV CODE 250: Performed by: SPECIALIST

## 2022-09-10 PROCEDURE — 770002 HCHG ROOM/CARE - OB PRIVATE (112)

## 2022-09-10 PROCEDURE — 36415 COLL VENOUS BLD VENIPUNCTURE: CPT

## 2022-09-10 PROCEDURE — 700105 HCHG RX REV CODE 258: Performed by: ANESTHESIOLOGY

## 2022-09-10 PROCEDURE — 700111 HCHG RX REV CODE 636 W/ 250 OVERRIDE (IP): Performed by: ANESTHESIOLOGY

## 2022-09-10 PROCEDURE — 160036 HCHG PACU - EA ADDL 30 MINS PHASE I: Performed by: SPECIALIST

## 2022-09-10 PROCEDURE — 01961 ANES CESAREAN DELIVERY ONLY: CPT | Performed by: ANESTHESIOLOGY

## 2022-09-10 PROCEDURE — 160035 HCHG PACU - 1ST 60 MINS PHASE I: Performed by: SPECIALIST

## 2022-09-10 PROCEDURE — 0UB70ZZ EXCISION OF BILATERAL FALLOPIAN TUBES, OPEN APPROACH: ICD-10-PCS | Performed by: SPECIALIST

## 2022-09-10 PROCEDURE — 85610 PROTHROMBIN TIME: CPT

## 2022-09-10 PROCEDURE — 160029 HCHG SURGERY MINUTES - 1ST 30 MINS LEVEL 4: Performed by: SPECIALIST

## 2022-09-10 PROCEDURE — A9270 NON-COVERED ITEM OR SERVICE: HCPCS | Performed by: ANESTHESIOLOGY

## 2022-09-10 PROCEDURE — 160048 HCHG OR STATISTICAL LEVEL 1-5: Performed by: SPECIALIST

## 2022-09-10 PROCEDURE — 86901 BLOOD TYPING SEROLOGIC RH(D): CPT

## 2022-09-10 PROCEDURE — 160002 HCHG RECOVERY MINUTES (STAT): Performed by: SPECIALIST

## 2022-09-10 PROCEDURE — 85025 COMPLETE CBC W/AUTO DIFF WBC: CPT

## 2022-09-10 PROCEDURE — 160041 HCHG SURGERY MINUTES - EA ADDL 1 MIN LEVEL 4: Performed by: SPECIALIST

## 2022-09-10 PROCEDURE — C1755 CATHETER, INTRASPINAL: HCPCS | Performed by: SPECIALIST

## 2022-09-10 PROCEDURE — 700102 HCHG RX REV CODE 250 W/ 637 OVERRIDE(OP): Performed by: ANESTHESIOLOGY

## 2022-09-10 PROCEDURE — 85027 COMPLETE CBC AUTOMATED: CPT

## 2022-09-10 PROCEDURE — 86850 RBC ANTIBODY SCREEN: CPT

## 2022-09-10 PROCEDURE — 88302 TISSUE EXAM BY PATHOLOGIST: CPT

## 2022-09-10 PROCEDURE — 86900 BLOOD TYPING SEROLOGIC ABO: CPT

## 2022-09-10 PROCEDURE — 700111 HCHG RX REV CODE 636 W/ 250 OVERRIDE (IP): Performed by: SPECIALIST

## 2022-09-10 RX ORDER — SODIUM CHLORIDE, SODIUM LACTATE, POTASSIUM CHLORIDE, CALCIUM CHLORIDE 600; 310; 30; 20 MG/100ML; MG/100ML; MG/100ML; MG/100ML
INJECTION, SOLUTION INTRAVENOUS PRN
Status: DISCONTINUED | OUTPATIENT
Start: 2022-09-10 | End: 2022-09-12 | Stop reason: HOSPADM

## 2022-09-10 RX ORDER — OXYCODONE HYDROCHLORIDE 5 MG/1
5 TABLET ORAL EVERY 4 HOURS PRN
Status: DISCONTINUED | OUTPATIENT
Start: 2022-09-11 | End: 2022-09-12 | Stop reason: HOSPADM

## 2022-09-10 RX ORDER — IBUPROFEN 800 MG/1
800 TABLET ORAL EVERY 8 HOURS PRN
Status: DISCONTINUED | OUTPATIENT
Start: 2022-09-14 | End: 2022-09-12 | Stop reason: HOSPADM

## 2022-09-10 RX ORDER — DIPHENHYDRAMINE HYDROCHLORIDE 50 MG/ML
12.5 INJECTION INTRAMUSCULAR; INTRAVENOUS
Status: DISCONTINUED | OUTPATIENT
Start: 2022-09-10 | End: 2022-09-10 | Stop reason: HOSPADM

## 2022-09-10 RX ORDER — KETOROLAC TROMETHAMINE 30 MG/ML
30 INJECTION, SOLUTION INTRAMUSCULAR; INTRAVENOUS EVERY 6 HOURS
Status: DISPENSED | OUTPATIENT
Start: 2022-09-10 | End: 2022-09-11

## 2022-09-10 RX ORDER — DIPHENHYDRAMINE HCL 25 MG
25 TABLET ORAL EVERY 6 HOURS PRN
Status: DISCONTINUED | OUTPATIENT
Start: 2022-09-11 | End: 2022-09-12 | Stop reason: HOSPADM

## 2022-09-10 RX ORDER — HYDROMORPHONE HYDROCHLORIDE 1 MG/ML
0.2 INJECTION, SOLUTION INTRAMUSCULAR; INTRAVENOUS; SUBCUTANEOUS
Status: DISCONTINUED | OUTPATIENT
Start: 2022-09-10 | End: 2022-09-10 | Stop reason: HOSPADM

## 2022-09-10 RX ORDER — ONDANSETRON 2 MG/ML
INJECTION INTRAMUSCULAR; INTRAVENOUS PRN
Status: DISCONTINUED | OUTPATIENT
Start: 2022-09-10 | End: 2022-09-10 | Stop reason: SURG

## 2022-09-10 RX ORDER — ACETAMINOPHEN 500 MG
1000 TABLET ORAL EVERY 6 HOURS
Status: DISPENSED | OUTPATIENT
Start: 2022-09-10 | End: 2022-09-11

## 2022-09-10 RX ORDER — ONDANSETRON 4 MG/1
4 TABLET, ORALLY DISINTEGRATING ORAL EVERY 6 HOURS PRN
Status: DISCONTINUED | OUTPATIENT
Start: 2022-09-11 | End: 2022-09-12 | Stop reason: HOSPADM

## 2022-09-10 RX ORDER — METHYLERGONOVINE MALEATE 0.2 MG/ML
0.2 INJECTION INTRAVENOUS ONCE
Status: COMPLETED | OUTPATIENT
Start: 2022-09-10 | End: 2022-09-10

## 2022-09-10 RX ORDER — METOCLOPRAMIDE HYDROCHLORIDE 5 MG/ML
10 INJECTION INTRAMUSCULAR; INTRAVENOUS ONCE
Status: COMPLETED | OUTPATIENT
Start: 2022-09-10 | End: 2022-09-10

## 2022-09-10 RX ORDER — DIPHENHYDRAMINE HYDROCHLORIDE 50 MG/ML
25 INJECTION INTRAMUSCULAR; INTRAVENOUS EVERY 6 HOURS PRN
Status: DISCONTINUED | OUTPATIENT
Start: 2022-09-10 | End: 2022-09-11

## 2022-09-10 RX ORDER — SODIUM CHLORIDE, SODIUM GLUCONATE, SODIUM ACETATE, POTASSIUM CHLORIDE AND MAGNESIUM CHLORIDE 526; 502; 368; 37; 30 MG/100ML; MG/100ML; MG/100ML; MG/100ML; MG/100ML
1500 INJECTION, SOLUTION INTRAVENOUS ONCE
Status: COMPLETED | OUTPATIENT
Start: 2022-09-10 | End: 2022-09-10

## 2022-09-10 RX ORDER — OXYCODONE HYDROCHLORIDE 10 MG/1
10 TABLET ORAL EVERY 4 HOURS PRN
Status: DISCONTINUED | OUTPATIENT
Start: 2022-09-10 | End: 2022-09-11

## 2022-09-10 RX ORDER — ACETAMINOPHEN 500 MG
1000 TABLET ORAL EVERY 6 HOURS PRN
Status: DISCONTINUED | OUTPATIENT
Start: 2022-09-14 | End: 2022-09-12 | Stop reason: HOSPADM

## 2022-09-10 RX ORDER — OXYCODONE HCL 5 MG/5 ML
5 SOLUTION, ORAL ORAL
Status: DISCONTINUED | OUTPATIENT
Start: 2022-09-10 | End: 2022-09-10 | Stop reason: HOSPADM

## 2022-09-10 RX ORDER — CARBOPROST TROMETHAMINE 250 UG/ML
250 INJECTION, SOLUTION INTRAMUSCULAR ONCE
Status: COMPLETED | OUTPATIENT
Start: 2022-09-10 | End: 2022-09-10

## 2022-09-10 RX ORDER — OXYCODONE HYDROCHLORIDE 10 MG/1
10 TABLET ORAL EVERY 4 HOURS PRN
Status: DISCONTINUED | OUTPATIENT
Start: 2022-09-11 | End: 2022-09-12 | Stop reason: HOSPADM

## 2022-09-10 RX ORDER — ACETAMINOPHEN 500 MG
1000 TABLET ORAL EVERY 6 HOURS
Status: DISCONTINUED | OUTPATIENT
Start: 2022-09-11 | End: 2022-09-12 | Stop reason: HOSPADM

## 2022-09-10 RX ORDER — HYDROMORPHONE HYDROCHLORIDE 1 MG/ML
0.4 INJECTION, SOLUTION INTRAMUSCULAR; INTRAVENOUS; SUBCUTANEOUS
Status: DISCONTINUED | OUTPATIENT
Start: 2022-09-10 | End: 2022-09-10 | Stop reason: HOSPADM

## 2022-09-10 RX ORDER — DIPHENHYDRAMINE HYDROCHLORIDE 50 MG/ML
25 INJECTION INTRAMUSCULAR; INTRAVENOUS EVERY 6 HOURS PRN
Status: DISCONTINUED | OUTPATIENT
Start: 2022-09-11 | End: 2022-09-12 | Stop reason: HOSPADM

## 2022-09-10 RX ORDER — HYDROMORPHONE HYDROCHLORIDE 1 MG/ML
0.1 INJECTION, SOLUTION INTRAMUSCULAR; INTRAVENOUS; SUBCUTANEOUS
Status: DISCONTINUED | OUTPATIENT
Start: 2022-09-10 | End: 2022-09-10 | Stop reason: HOSPADM

## 2022-09-10 RX ORDER — SODIUM CHLORIDE, SODIUM LACTATE, POTASSIUM CHLORIDE, CALCIUM CHLORIDE 600; 310; 30; 20 MG/100ML; MG/100ML; MG/100ML; MG/100ML
INJECTION, SOLUTION INTRAVENOUS CONTINUOUS
Status: DISCONTINUED | OUTPATIENT
Start: 2022-09-10 | End: 2022-09-12 | Stop reason: HOSPADM

## 2022-09-10 RX ORDER — HYDROMORPHONE HYDROCHLORIDE 1 MG/ML
0.2 INJECTION, SOLUTION INTRAMUSCULAR; INTRAVENOUS; SUBCUTANEOUS
Status: DISCONTINUED | OUTPATIENT
Start: 2022-09-10 | End: 2022-09-11

## 2022-09-10 RX ORDER — MEPERIDINE HYDROCHLORIDE 25 MG/ML
6.25 INJECTION INTRAMUSCULAR; INTRAVENOUS; SUBCUTANEOUS
Status: DISCONTINUED | OUTPATIENT
Start: 2022-09-10 | End: 2022-09-10 | Stop reason: HOSPADM

## 2022-09-10 RX ORDER — OXYCODONE HCL 5 MG/5 ML
10 SOLUTION, ORAL ORAL
Status: DISCONTINUED | OUTPATIENT
Start: 2022-09-10 | End: 2022-09-10 | Stop reason: HOSPADM

## 2022-09-10 RX ORDER — OXYCODONE HYDROCHLORIDE 5 MG/1
5 TABLET ORAL EVERY 4 HOURS PRN
Status: DISCONTINUED | OUTPATIENT
Start: 2022-09-10 | End: 2022-09-11

## 2022-09-10 RX ORDER — ONDANSETRON 2 MG/ML
4 INJECTION INTRAMUSCULAR; INTRAVENOUS EVERY 6 HOURS PRN
Status: DISCONTINUED | OUTPATIENT
Start: 2022-09-11 | End: 2022-09-12 | Stop reason: HOSPADM

## 2022-09-10 RX ORDER — ONDANSETRON 2 MG/ML
4 INJECTION INTRAMUSCULAR; INTRAVENOUS EVERY 6 HOURS PRN
Status: DISCONTINUED | OUTPATIENT
Start: 2022-09-10 | End: 2022-09-11

## 2022-09-10 RX ORDER — BUPIVACAINE HYDROCHLORIDE 7.5 MG/ML
INJECTION, SOLUTION INTRASPINAL PRN
Status: DISCONTINUED | OUTPATIENT
Start: 2022-09-10 | End: 2022-09-10 | Stop reason: SURG

## 2022-09-10 RX ORDER — HALOPERIDOL 5 MG/ML
1 INJECTION INTRAMUSCULAR
Status: DISCONTINUED | OUTPATIENT
Start: 2022-09-10 | End: 2022-09-10 | Stop reason: HOSPADM

## 2022-09-10 RX ORDER — SODIUM CHLORIDE, SODIUM GLUCONATE, SODIUM ACETATE, POTASSIUM CHLORIDE AND MAGNESIUM CHLORIDE 526; 502; 368; 37; 30 MG/100ML; MG/100ML; MG/100ML; MG/100ML; MG/100ML
INJECTION, SOLUTION INTRAVENOUS
Status: DISCONTINUED | OUTPATIENT
Start: 2022-09-10 | End: 2022-09-10 | Stop reason: SURG

## 2022-09-10 RX ORDER — LABETALOL HYDROCHLORIDE 5 MG/ML
5 INJECTION, SOLUTION INTRAVENOUS
Status: DISCONTINUED | OUTPATIENT
Start: 2022-09-10 | End: 2022-09-10 | Stop reason: HOSPADM

## 2022-09-10 RX ORDER — DOCUSATE SODIUM 100 MG/1
100 CAPSULE, LIQUID FILLED ORAL 2 TIMES DAILY PRN
Status: DISCONTINUED | OUTPATIENT
Start: 2022-09-10 | End: 2022-09-12 | Stop reason: HOSPADM

## 2022-09-10 RX ORDER — ONDANSETRON 2 MG/ML
4 INJECTION INTRAMUSCULAR; INTRAVENOUS
Status: DISCONTINUED | OUTPATIENT
Start: 2022-09-10 | End: 2022-09-10 | Stop reason: HOSPADM

## 2022-09-10 RX ORDER — MORPHINE SULFATE 0.5 MG/ML
INJECTION, SOLUTION EPIDURAL; INTRATHECAL; INTRAVENOUS PRN
Status: DISCONTINUED | OUTPATIENT
Start: 2022-09-10 | End: 2022-09-10 | Stop reason: SURG

## 2022-09-10 RX ORDER — IBUPROFEN 800 MG/1
800 TABLET ORAL EVERY 8 HOURS
Status: DISCONTINUED | OUTPATIENT
Start: 2022-09-11 | End: 2022-09-12 | Stop reason: HOSPADM

## 2022-09-10 RX ORDER — CEFAZOLIN SODIUM 1 G/3ML
INJECTION, POWDER, FOR SOLUTION INTRAMUSCULAR; INTRAVENOUS PRN
Status: DISCONTINUED | OUTPATIENT
Start: 2022-09-10 | End: 2022-09-10 | Stop reason: SURG

## 2022-09-10 RX ORDER — CITRIC ACID/SODIUM CITRATE 334-500MG
30 SOLUTION, ORAL ORAL ONCE
Status: COMPLETED | OUTPATIENT
Start: 2022-09-10 | End: 2022-09-10

## 2022-09-10 RX ORDER — KETOROLAC TROMETHAMINE 30 MG/ML
INJECTION, SOLUTION INTRAMUSCULAR; INTRAVENOUS PRN
Status: DISCONTINUED | OUTPATIENT
Start: 2022-09-10 | End: 2022-09-10 | Stop reason: SURG

## 2022-09-10 RX ORDER — DIPHENHYDRAMINE HYDROCHLORIDE 50 MG/ML
12.5 INJECTION INTRAMUSCULAR; INTRAVENOUS EVERY 6 HOURS PRN
Status: DISCONTINUED | OUTPATIENT
Start: 2022-09-10 | End: 2022-09-11

## 2022-09-10 RX ORDER — HYDRALAZINE HYDROCHLORIDE 20 MG/ML
5 INJECTION INTRAMUSCULAR; INTRAVENOUS
Status: DISCONTINUED | OUTPATIENT
Start: 2022-09-10 | End: 2022-09-10 | Stop reason: HOSPADM

## 2022-09-10 RX ORDER — DEXAMETHASONE SODIUM PHOSPHATE 4 MG/ML
INJECTION, SOLUTION INTRA-ARTICULAR; INTRALESIONAL; INTRAMUSCULAR; INTRAVENOUS; SOFT TISSUE PRN
Status: DISCONTINUED | OUTPATIENT
Start: 2022-09-10 | End: 2022-09-10 | Stop reason: SURG

## 2022-09-10 RX ORDER — PHENYLEPHRINE HCL IN 0.9% NACL 0.5 MG/5ML
SYRINGE (ML) INTRAVENOUS PRN
Status: DISCONTINUED | OUTPATIENT
Start: 2022-09-10 | End: 2022-09-10 | Stop reason: SURG

## 2022-09-10 RX ORDER — ALBUTEROL SULFATE 2.5 MG/3ML
2.5 SOLUTION RESPIRATORY (INHALATION)
Status: DISCONTINUED | OUTPATIENT
Start: 2022-09-10 | End: 2022-09-10 | Stop reason: HOSPADM

## 2022-09-10 RX ADMIN — KETOROLAC TROMETHAMINE 30 MG: 30 INJECTION, SOLUTION INTRAMUSCULAR at 11:46

## 2022-09-10 RX ADMIN — METOCLOPRAMIDE 10 MG: 5 INJECTION, SOLUTION INTRAMUSCULAR; INTRAVENOUS at 09:11

## 2022-09-10 RX ADMIN — FAMOTIDINE 20 MG: 10 INJECTION, SOLUTION INTRAVENOUS at 09:11

## 2022-09-10 RX ADMIN — SODIUM CHLORIDE, SODIUM GLUCONATE, SODIUM ACETATE, POTASSIUM CHLORIDE AND MAGNESIUM CHLORIDE 1500 ML: 526; 502; 368; 37; 30 INJECTION, SOLUTION INTRAVENOUS at 09:26

## 2022-09-10 RX ADMIN — SODIUM CITRATE AND CITRIC ACID MONOHYDRATE 30 ML: 500; 334 SOLUTION ORAL at 09:37

## 2022-09-10 RX ADMIN — METHYLERGONOVINE MALEATE 0.2 MG: 0.2 INJECTION, SOLUTION INTRAMUSCULAR; INTRAVENOUS at 12:53

## 2022-09-10 RX ADMIN — DEXAMETHASONE SODIUM PHOSPHATE 8 MG: 4 INJECTION, SOLUTION INTRA-ARTICULAR; INTRALESIONAL; INTRAMUSCULAR; INTRAVENOUS; SOFT TISSUE at 10:32

## 2022-09-10 RX ADMIN — MORPHINE SULFATE 100 MCG: 0.5 INJECTION, SOLUTION EPIDURAL; INTRATHECAL; INTRAVENOUS at 10:29

## 2022-09-10 RX ADMIN — FENTANYL CITRATE 10 MCG: 50 INJECTION, SOLUTION INTRAMUSCULAR; INTRAVENOUS at 10:29

## 2022-09-10 RX ADMIN — Medication 100 MCG: at 11:07

## 2022-09-10 RX ADMIN — SODIUM CHLORIDE, SODIUM GLUCONATE, SODIUM ACETATE, POTASSIUM CHLORIDE AND MAGNESIUM CHLORIDE: 526; 502; 368; 37; 30 INJECTION, SOLUTION INTRAVENOUS at 10:15

## 2022-09-10 RX ADMIN — CARBOPROST TROMETHAMINE 250 MCG: 250 INJECTION, SOLUTION INTRAMUSCULAR at 12:53

## 2022-09-10 RX ADMIN — CEFAZOLIN 2 G: 330 INJECTION, POWDER, FOR SOLUTION INTRAMUSCULAR; INTRAVENOUS at 10:30

## 2022-09-10 RX ADMIN — PHENYLEPHRINE HYDROCHLORIDE 30 MCG/MIN: 10 INJECTION INTRAVENOUS at 10:30

## 2022-09-10 RX ADMIN — ONDANSETRON 4 MG: 2 INJECTION INTRAMUSCULAR; INTRAVENOUS at 10:32

## 2022-09-10 RX ADMIN — BUPIVACAINE HYDROCHLORIDE IN DEXTROSE 1.6 ML: 7.5 INJECTION, SOLUTION SUBARACHNOID at 10:29

## 2022-09-10 RX ADMIN — OXYTOCIN 1000 ML: 10 INJECTION, SOLUTION INTRAMUSCULAR; INTRAVENOUS at 11:02

## 2022-09-10 ASSESSMENT — LIFESTYLE VARIABLES
EVER FELT BAD OR GUILTY ABOUT YOUR DRINKING: NO
ALCOHOL_USE: NO
TOTAL SCORE: 0
TOTAL SCORE: 0
ON A TYPICAL DAY WHEN YOU DRINK ALCOHOL HOW MANY DRINKS DO YOU HAVE: 0
CONSUMPTION TOTAL: NEGATIVE
EVER_SMOKED: NEVER
EVER HAD A DRINK FIRST THING IN THE MORNING TO STEADY YOUR NERVES TO GET RID OF A HANGOVER: NO
AVERAGE NUMBER OF DAYS PER WEEK YOU HAVE A DRINK CONTAINING ALCOHOL: 0
HOW MANY TIMES IN THE PAST YEAR HAVE YOU HAD 5 OR MORE DRINKS IN A DAY: 0
TOTAL SCORE: 0
HAVE YOU EVER FELT YOU SHOULD CUT DOWN ON YOUR DRINKING: NO
HAVE PEOPLE ANNOYED YOU BY CRITICIZING YOUR DRINKING: NO

## 2022-09-10 ASSESSMENT — PATIENT HEALTH QUESTIONNAIRE - PHQ9
1. LITTLE INTEREST OR PLEASURE IN DOING THINGS: NOT AT ALL
2. FEELING DOWN, DEPRESSED, IRRITABLE, OR HOPELESS: NOT AT ALL
SUM OF ALL RESPONSES TO PHQ9 QUESTIONS 1 AND 2: 0
SUM OF ALL RESPONSES TO PHQ9 QUESTIONS 1 AND 2: 0
2. FEELING DOWN, DEPRESSED, IRRITABLE, OR HOPELESS: NOT AT ALL

## 2022-09-10 ASSESSMENT — PAIN SCALES - GENERAL: PAIN_LEVEL: 0

## 2022-09-10 NOTE — ANESTHESIA PREPROCEDURE EVALUATION
Case: [id number] Date/Time: 09/10/22 0915    Procedure: REPEAT  SECTION WITH BILATERAL SALPINGECTOMY    Pre-op diagnosis: PREVIOUS  SECTION, PERMANENT STERILIZATION, 39    Location: [facility] OR 01 / SURGERY LABOR AND DELIVERY    Surgeons: Sinan Sutton M.D.          Relevant Problems   ANESTHESIA (within normal limits)      PULMONARY (within normal limits)      NEURO (within normal limits)      CARDIAC (within normal limits)      GI (within normal limits)       (within normal limits)      ENDO (within normal limits)      OB   (positive) Previous  section complicating pregnancy       Physical Exam    Airway   Mallampati: II  TM distance: >3 FB  Neck ROM: full       Cardiovascular - normal exam  Rhythm: regular  Rate: normal  (-) murmur     Dental - normal exam           Pulmonary - normal exam  Breath sounds clear to auscultation     Abdominal    Neurological - normal exam                 Anesthesia Plan    ASA 2       Plan - spinal   Neuraxial block will be primary anesthetic                Postoperative Plan: Postoperative administration of opioids is intended.    Pertinent diagnostic labs and testing reviewed    Informed Consent:    Anesthetic plan and risks discussed with patient.

## 2022-09-10 NOTE — PROGRESS NOTES
I received a call from the patient's nurse, Rosa M, who told me that the patient was experiencing more than average uterine bleeding.  I gave an order for the patient to receive both Hemabate and Methergine intramuscularly.  The patient received both Hemabate and Methergine intramuscularly.  After the patient had received these medications I visited with the patient and examined the patient and examination of the patient's abdomen reveals that the uterine fundus is indeed very firm, below the umbilicus, and with deep and aggressive palpation of the uterus almost no bleeding was noted.  The estimated blood loss that has occurred in recovery is probably about 500 cc.  We will order a stat CBC and PT, PTT, and INR.  I anticipate however that given the fact that her bleeding seems to have minimized significantly and given the fact that her uterus is now very firm and that hardly any blood can be seen with aggressive palpation of the uterine fundus that she will not continue to have any postpartum hemorrhage.  I would describe her postpartum hemorrhage as mild and this appears to now be resolving.  We will also recheck a CBC tomorrow morning.  I explained to the patient that it appears she has had a postpartum hemorrhage and that this post partum hemorrhage appears to be resolving or resolved with the medicine, namely both Hemabate and Methergine, that we have given her.  I explained to her that I do anticipate that she will not continue to have postpartum hemorrhage.  I explained to her that it is possible that we might during the course of this hospitalization need to give her a blood transfusion especially given the fact that she is already anemic and has chronic anemia.  She acknowledged these things.  Sinan Sutton MD

## 2022-09-10 NOTE — OR SURGEON
Immediate Post OP Note    PreOp Diagnosis:   1.) Intrauterine pregnancy at 39 weeks gestation with good dates.  2.) Previous  section times two with the patient would like to be delivered via repeat  section.  3.) Multiparity in the patient's or permanent tubal sterilization.  4.) Maternal anemia per the patient's hemoglobin and hematocrit this morning were 8.8 and 28.4 percent respectively. This anemia does appear to be chronic.    PostOp Diagnosis:   1.) Intrauterine pregnancy at 39 weeks gestation with good dates.  2.) Previous  section times two with the patient would like to be delivered via repeat  section.  3.) Multiparity in the patient's or permanent tubal sterilization.  4.) Maternal anemia. The patient's hemoglobin and hematocrit this morning were 8.8 grams per deciliter and 28.4 percent respectively. This anemia does indeed appear to be chronic.  5.) Live term female  with Apgar scores of 8 and 8 at one and five minutes respectively and a  weight of 4,660 grams    Procedure(s):  REPEAT  SECTION WITH BILATERAL SALPINGECTOMY - Wound Class: Clean Contaminated    Surgeon(s):  PEDRO Carlos M.D.    Anesthesiologist/Type of Anesthesia:  Anesthesiologist: Lanre aSndoval M.D./Spinal    Surgical Staff:  Circulator: Raquel Chavez R.N.  Scrub Person: Amado Simeon    Specimens removed if any:  Bilateral Fallopian tubes.     Estimated Blood Loss:   700 cc.    Findings:   1.) Live term female  with Apgar scores of 8 and 8 at one and five minutes respectively and a  weight of 4,660 grams.  2.) Examination of the left ovary reveals changes of perhaps a mild hyperreactio luteinalis.    Complications:   None.        9/10/2022 12:09 PM Sinan Sutton M.D.

## 2022-09-10 NOTE — H&P
Jessica Hoff   YOB: 1995  Date of today's admission/surgery: September 10, 2022  Facility: [facility] Labor & Delivery    ID: The patient is a very pleasant 27-year-old multipara (para-2 with 2 previous  sections) who is today 39 weeks and 0 days gestation.    Chief complaint: The patient has no complains other than for generalized discomfort consistent with term pregnancy.    History of present illness:  The patient has had prenatal care with my suffering because of her pregnancy. Her dates were established with the first trimester ultrasound. She has also been seen during the course of this pregnancy by perinatology, namely [facility]. Her pregnancy has been collocated by the fact that she has had 2 previous  sections. She is scheduled today for repeat  section. She desires permanent tubal sterilization and asked me to perform bilateral tubal sterilization procedure at the time of repeat  section so she is scheduled today not only for repeat  section but also bilateral salpingectomy.    Past medical history:  The patient has no medical illnesses other than perhaps for a history of reported anemia.    Past surgical history: the patient has had previous cholecystectomy. She has had hernia repair. She has had 2 previous  sections.    Medications:  The patient takes no medications other than for prenatal vitamins.    Allergies: The patient says that she has no known drug allergies.    Social history: The patient denies smoking. She denies consuming alcoholic beverages. She denies the use of recreational drugs.    Review of Systems:    General: The patient denies any fevers or chills or sweats.   Pulmonary: The patient denies any coughing or wheezing or chest pain or shortness of breath.  Cardiovascular: The patient denies any palpitations, chest pain, dyspnea.  Gastrointestinal: The patient denies any nausea,  vomiting, diarrhea, constipation, hematochezia, melena.  Genitourinary: The patient says she continues to feel fetal movement throughout the day every day. She does not report any vaginal bleeding. She does not report any leakage of fluid per vagina. She does not report any frequent painful uterine contractions.  Musculoskeletal: The patient denies any arthralgias or myalgias other than for hip pain and low back pain..  Neurological: The patient denies any headaches or syncope or seizures.      Physical Exam:    General: The patient appears well developed and well-nourished and relaxed and alert and comfortable and in no apparent distress.  HEENT: Normocephalic, atraumatic, pupils equal, round, reactive to light and accommodation, extra ocular motions intact, pharynx clear.  There is no thyromegaly.  There is no cervical lymphadenopathy.  Chest: Heart regular rate and rhythm, with no murmurs or rubs or gallops.  The lungs are clear to auscultation bilaterally.  Abdomen: Examination of the patient's abdomen with the patient in the dorsal supine position reveals that the abdomen is  and about nine months saw in.  Digital cervical exam: Digital cervical exam reveals that the cervix is fingertip, 50 percent, and -1.  Extremities: No clubbing or cyanosis or edema except for perhaps mild bilateral symmetrical lower extremity edema consistent with term pregnancy.  Neurological: Nonfocal.        Assessment:   1.) Intrauterine pregnancy at 39 weeks gestation with good dates.  2.) Previous  section times two with the patient would like to be delivered via repeat  section.  3.) Multiparity in the patient's or permanent tubal sterilization.    Plan:   We will proceed today with repeat low transverse  section and bilateral salpingectomy. I have discussed with the patient and explained to the patient in detail and at length what repeat  section and bilateral tubal sterilization is and what  repeat  section and bilateral tubal sterilization involves and I have discussed with the patient and explained to the patient in detail and at length the risks and benefits and alternatives of repeat  section and bilateral tubal sterilization. After our discussions today and after answering her question she to me that she very much wishes for us to proceed with repeat  section and bilateral tubal sterilization namely bilateral salpingectomy.        __________________  Sinan Sutton M.D.

## 2022-09-10 NOTE — ANESTHESIA PROCEDURE NOTES
Spinal Block    Date/Time: 9/10/2022 10:20 AM  Performed by: Lanre Sandoval M.D.  Authorized by: Lanre Sandoval M.D.     Patient Location:  OR  Start Time:  9/10/2022 10:20 AM  End Time:  9/10/2022 10:29 AM  Reason for Block: primary anesthetic    patient identified, IV checked, site marked, risks and benefits discussed, surgical consent, monitors and equipment checked, pre-op evaluation and timeout performed    Patient Position:  Sitting  Prep: ChloraPrep, patient draped and sterile technique    Monitoring:  Blood pressure, continuous pulse oximetry and heart rate  Approach:  Midline  Location:  L4-5  Injection Technique:  Single-shot  Skin infiltration:  Lidocaine  Strength:  1%  Dose:  3ml  Needle Type:  Pencan  Needle Gauge:  25 G  CSF flowing pre/post injection:  Yes  Sensory Level:  T6   Difficulty with Pencan, change to CSE technique with easy CINTHIA and then CSF with pencan

## 2022-09-10 NOTE — ANESTHESIA TIME REPORT
Anesthesia Start and Stop Event Times     Date Time Event    9/10/2022 1015 Anesthesia Start     1201 Anesthesia Stop        Responsible Staff  09/10/22    Name Role Begin End    Lanre Sandoval M.D. Anesth 1015 1201        Overtime Reason:  no overtime (within assigned shift)    Comments:

## 2022-09-10 NOTE — OP REPORT
DATE OF SERVICE:  09/10/2022     PREOPERATIVE DIAGNOSES:  1.  Intrauterine pregnancy at 39 weeks and 0 days gestation with good dates.  2.  Previous  section x2 and the patient wishes to be delivered via   repeat  section.  3.  Multiparity and the patient desires permanent tubal sterilization.  4.  Maternal anemia.  The patient's hemoglobin and hematocrit this morning   were 8.8 g/dL and 28.4% respectively.  This anemia does appear to be chronic.     POSTOPERATIVE DIAGNOSES:  1.  Intrauterine pregnancy at 39 weeks and 0 days gestation with good dates.  2.  Previous  section x2 and the patient wishes to be delivered via   repeat  section.  3.  Multiparity and the patient desires permanent tubal sterilization.  4.  Maternal anemia.  The patient's hemoglobin and hematocrit this morning   were 8.8 g/dL and 28.4% respectively.  This anemia does appear to be chronic.  5.  Live term female  with Apgar scores of 8 and 8 at 1 and 5 minutes   respectively and a  weight of 4660 grams.     PROCEDURES:  Repeat low transverse  section and bilateral   salpingectomy.     SURGEON:  Sinan Sutton MD     ASSISTANT:  Niurka Soler MD     ANESTHESIA:  Spinal.     ANESTHESIOLOGIST:  Lanre Sandoval MD     FINDINGS:  1.  Live term female  with Apgar scores of 8 and 8 at 1 and 5 minutes   respectively and a  weight of 4660 grams.  2.  Examination of the left ovary reveals changes that are perhaps consistent   with mild hyperreactio luteinalis.     SPECIMENS:  Bilateral fallopian tubes.     COMPLICATIONS:  None.     ESTIMATED BLOOD LOSS:  Approximately 700 mL.     DESCRIPTION OF PROCEDURE:  After appropriate consents have been obtained, the   patient was taken to the operating room and given spinal anesthesia.  She was   prepped and draped in the dorsal supine position and a Reyna catheter was   noted to be in place and draining urine.  Anesthesia was tested and  noted to   be excellent.  The lower abdominal horizontal surgical scar (Pfannenstiel   scar) was identified and an examination of the scar reveals that there is some   keloid formation in certain areas.  The scar was then resected, thus creating   a Pfannenstiel incision.  The scar was resected with a scalpel.  The   Pfannenstiel incision thus created, was then continued deeply through the   subcutaneous tissues using Bovie electrocautery and hemostasis was maintained   with Bovie electrocautery.  The anterior rectus fascia was identified and   incised transversely with Bovie electrocautery and this incision was extended   bilaterally with Bovie electrocautery.  Next, the superior aspect of the   fascial incision was doubly grasped with Kocher clamps and then undermined   from the underlying rectus muscle with both blunt dissection and Bovie   electrocautery.  Next, the inferior aspect of the fascial incision was   similarly doubly grasped with Kocher clamps and undermined from the underlying   rectus muscle with both blunt dissection and Bovie electrocautery.  The   midline of the rectus muscle was identified and the rectus muscle was grasped   on either side of the midline with Kellie clamps and elevated and an incision   was made in the midline to the rectus muscle and between these clamps,   vertically, using Bovie electrocautery and this incision was extended   superiorly and inferiorly with Bovie electrocautery.  Blunt dissection through   the preperitoneal adipose tissues allows identification of the parietal   peritoneum, which was entered bluntly and this entry was then continued   superiorly and inferiorly with Bovie electrocautery with care taken to avoid   the bladder.  Some adhesions of the omentum to the anterior abdominal wall are   identified and lysed with Bovie electrocautery.  After this, the Hayes O   self-retaining retractor was inserted and set up in the usual fashion and   found to provide  excellent exposure of the anterior lower uterine segment.    The serosa overlying the segment was grasped and incised with Metzenbaum   scissors with care taken to avoid the bladder and this incision was extended   bilaterally with Metzenbaum scissors with care taken to avoid the bladder and   the bladder was then dissected away anteriorly with blunt dissection.  The   anterior lower uterine segment was then incised transversely with a scalpel.    Rupture of membranes revealed port-wine colored amniotic fluid.  The   hysterotomy was extended bilaterally with blunt dissection.  A hand was placed   in the intrauterine cavity around baby's head.  Fundal pressure was used to   deliver baby's head and then baby's body.  Baby's nasopharynx was suctioned   with a bulb syringe.  After 30-second delayed cord clamping was observed, the   umbilical cord was doubly clamped and cut and baby was handed to the waiting   nurse.  The placenta was manually removed.  The uterus was not exteriorized.    The interior of the uterus was wiped clean of products of conception.  The   cervix was dilated with a pair of ring forceps, and then this pair of ring   forceps was then discarded.  The hysterotomy was reapproximated first with   placement of no less than 4 interrupted mattress sutures of 0 chromic and then   a continuous interlocking suture of 0 chromic.  The entire length of the   hysterotomy repair was examined and hemostasis was noted to be excellent.  The   uterus was palpated and found to be firm.  The right cornual region of the   uterus was palpated and manipulated in such a way, so as to expose the   proximal right fallopian tube without exteriorizing the uterus.  The proximal   right fallopian tube was grasped with Hecker clamps and then serially   followed with Barbara clamps to its distal fimbriated end.  The distal   fimbriated end of the right fallopian tube was clearly identified.  After the   distal fimbriated end of  the right fallopian tube was clearly identified, the   right fallopian tube was then resected (right salpingectomy was performed) in   the usual fashion by serially thoroughly cauterizing, sealing, and cutting the   right mesosalpinx from distal to proximal using the small handheld LigaSure   bipolar cutting forceps instrument and then thoroughly cauterizing, sealing,   and cutting the right proximal fallopian tube.  The right fallopian tube was   then submitted as a specimen.  The right adnexa was examined and hemostasis   was noted to be excellent.  The right adnexa was replaced in the peritoneal   cavity.  The left cornual region of the uterus was palpated and manipulated   and the proximal left fallopian tube was exposed without exteriorizing the   uterus.  The proximal left fallopian tube was then grasped with Barbara   clamps.  The left fallopian tube was then followed to its distal fimbriated   end.  The distal fimbriated end of the left fallopian tube was clearly   identified.  After the distal fimbriated end of the left fallopian tube was   clearly identified, the left fallopian tube was resected (a left salpingectomy   was performed) in the usual fashion by thoroughly cauterizing, sealing, and   cutting the left proximal fallopian tube and then serially thoroughly   cauterizing, sealing, and cutting the left mesosalpinx from proximal to distal   using the small handheld LigaSure bipolar cutting forceps instrument.  The   left fallopian tube was submitted as a specimen.  During this process of   performing left salpingectomy, a portion of the left fallopian tube was noted   to be very adherent to the left ovary.  Also, the left ovary was examined and   changes consistent with mild hyperreactio luteinalis were appreciated. After   the left fallopian tube was examined and after hemostasis was found to be   excellent, the left adnexa was replaced in the peritoneal cavity.  The   hysterotomy was reexamined  along its entire length and again hemostasis was   noted to be excellent.  The Hayes O self-retaining retractor was removed.    Lap and needle counts were reported to be correct at this time.  The parietal   peritoneum was identified and reapproximated with simple continuous suture   using 3-0 Vicryl.  The rectus muscles were reapproximated in midline with   placement of multiple interrupted mattress sutures of 2-0 chromic.  Hemostasis   was noted to be excellent.  The anterior rectus fascia was identified and   reapproximated with a simple continuous suture using 1 Vicryl.  The wound was   copiously irrigated and drained and hemostasis noted to be excellent.  The   upper aspect of the wound was undermined with Bovie electrocautery in the   usual fashion.  The subcutaneous tissues were reapproximated with a continuous   simple suture of 3-0 Vicryl.  The wound was copiously irrigated and drained   again and hemostasis was noted to be excellent and the lower aspect of the   wound was also similarly undermined with Bovie electrocautery.  Finally, the   skin was reapproximated with the placement of many interrupted buried sutures   of 4-0 Monocryl placed in the dermis and at least one set sutures placed for   every 1 cm of length along the entire length of the skin incision.  The skin   incision was reapproximated nicely in this way.  The procedure was terminated   with final lap and needle counts reported to be correct x2 at the end of the   procedure.  The patient tolerated the procedure well and sent to   postanesthesia recovery in stable condition.        ______________________________  MD LUMA Montano/KAILEY/BNE    DD:  09/10/2022 12:59  DT:  09/10/2022 13:37    Job#:  [id number]    CC:Niurka Soler MD(User)  Lanre Sandoval MD(User)  Corey Yin MD(User)

## 2022-09-10 NOTE — ANESTHESIA POSTPROCEDURE EVALUATION
Patient: Jessica Hoff    Procedure Summary     Date: 09/10/22 Room / Location: [facility] OR 01 / SURGERY LABOR AND DELIVERY    Anesthesia Start: 1015 Anesthesia Stop: 1201    Procedure: REPEAT  SECTION WITH BILATERAL SALPINGECTOMY (Bilateral) Diagnosis:        delivery delivered      (delivered by repeat c/s)    Surgeons: Sinan Sutton M.D. Responsible Provider: Lanre Sandoval M.D.    Anesthesia Type: spinal ASA Status: 2          Final Anesthesia Type: spinal  Last vitals  BP   Blood Pressure: (!) 99/57    Temp   36.3 °C (97.4 °F)    Pulse   81   Resp        SpO2   96 %      Anesthesia Post Evaluation    Patient location during evaluation: PACU  Patient participation: complete - patient participated  Level of consciousness: awake and alert  Pain score: 0    Airway patency: patent  Anesthetic complications: no  Cardiovascular status: hemodynamically stable  Respiratory status: acceptable  Hydration status: euvolemic    PONV: none          No notable events documented.

## 2022-09-11 LAB
ERYTHROCYTE [DISTWIDTH] IN BLOOD BY AUTOMATED COUNT: 52.5 FL (ref 35.9–50)
HCT VFR BLD AUTO: 24.4 % (ref 37–47)
HGB BLD-MCNC: 7.5 G/DL (ref 12–16)
MCH RBC QN AUTO: 26.8 PG (ref 27–33)
MCHC RBC AUTO-ENTMCNC: 30.7 G/DL (ref 33.6–35)
MCV RBC AUTO: 87.1 FL (ref 81.4–97.8)
PLATELET # BLD AUTO: 225 K/UL (ref 164–446)
PMV BLD AUTO: 11.8 FL (ref 9–12.9)
RBC # BLD AUTO: 2.8 M/UL (ref 4.2–5.4)
WBC # BLD AUTO: 22.6 K/UL (ref 4.8–10.8)

## 2022-09-11 PROCEDURE — A9270 NON-COVERED ITEM OR SERVICE: HCPCS | Performed by: ANESTHESIOLOGY

## 2022-09-11 PROCEDURE — 770002 HCHG ROOM/CARE - OB PRIVATE (112)

## 2022-09-11 PROCEDURE — 85027 COMPLETE CBC AUTOMATED: CPT

## 2022-09-11 PROCEDURE — A9270 NON-COVERED ITEM OR SERVICE: HCPCS | Performed by: SPECIALIST

## 2022-09-11 PROCEDURE — 700102 HCHG RX REV CODE 250 W/ 637 OVERRIDE(OP): Performed by: SPECIALIST

## 2022-09-11 PROCEDURE — 700111 HCHG RX REV CODE 636 W/ 250 OVERRIDE (IP): Performed by: ANESTHESIOLOGY

## 2022-09-11 PROCEDURE — 700102 HCHG RX REV CODE 250 W/ 637 OVERRIDE(OP): Performed by: ANESTHESIOLOGY

## 2022-09-11 PROCEDURE — 36415 COLL VENOUS BLD VENIPUNCTURE: CPT

## 2022-09-11 RX ADMIN — OXYCODONE 5 MG: 5 TABLET ORAL at 18:03

## 2022-09-11 RX ADMIN — OXYCODONE HYDROCHLORIDE 10 MG: 10 TABLET ORAL at 22:05

## 2022-09-11 RX ADMIN — ACETAMINOPHEN 1000 MG: 500 TABLET ORAL at 05:05

## 2022-09-11 RX ADMIN — KETOROLAC TROMETHAMINE 30 MG: 30 INJECTION, SOLUTION INTRAMUSCULAR; INTRAVENOUS at 05:04

## 2022-09-11 RX ADMIN — IBUPROFEN 800 MG: 800 TABLET, FILM COATED ORAL at 20:24

## 2022-09-11 RX ADMIN — ACETAMINOPHEN 1000 MG: 500 TABLET ORAL at 17:30

## 2022-09-11 RX ADMIN — DOCUSATE SODIUM 100 MG: 100 CAPSULE, LIQUID FILLED ORAL at 05:05

## 2022-09-11 ASSESSMENT — PAIN DESCRIPTION - PAIN TYPE
TYPE: SURGICAL PAIN

## 2022-09-11 NOTE — PROGRESS NOTES
Late entry due to patient care:     1243- Call to Dr. Sutton with request for uterotonics. Pt wasn't having heavy bleeding, and uterus was below umbilicus but she initially had small oozing of blood with fundal massage with one moderate sized clot, and shortly thereafter had moderate pooling of blood. Fundus was a little boggy at times but firmed nicely with massage so call was placed. After Methergine and Hemabate were given, heavy bleeding was noted on chux. Fundus was very firm at umbilicus at that point, pads were changed and weighed for 347 mL. Dr. Sutton arrived shortly after patient cleaned and pads were weighed. Fundus continued to be very firm with scant to no active bleeding, although more blood was noted under the patient. Labs were ordered, and as patient was stable and asymptomatic and her bleeding was resolved, she was deemed stable to transfer to . Pt was held in PACU due to bed availability. Bleeding remained scant, vitals stable. Drank too much apple juice and vomited but felt much better. Pt did start experiencing pain, rated 6/10 but declined offer of pain medication. Pt transferred to PP around 1500. Moved self to bed easily. Report given. Afterwards, remaining chux that were under patient were weighed for an additional 326 mL. Dr. Sutton was updated on pt's PP H/H and updated PACU blood loss.

## 2022-09-11 NOTE — CARE PLAN
The patient is Stable - Low risk of patient condition declining or worsening    Shift Goals  Clinical Goals: rest, pain control, minimal bleeding, breastfeeding support  Patient Goals: rest, eat  Family Goals: support    Progress made toward(s) clinical / shift goals:  clinically stable  Problem: Pain - Standard  Goal: Alleviation of pain or a reduction in pain to the patient’s comfort goal  Outcome: Progressing  Note: Pain control at this time. Medicated as scheduled/ needed.      Problem: Altered Physiologic Condition  Goal: Patient physiologically stable as evidenced by normal lochia, palpable uterine involution and vitals within normal limits  Outcome: Progressing  Note: Fundus firm at U, lochia rubra minimal. Surgical incision with mepilex dressing CDI. VSS       Patient is not progressing towards the following goals:

## 2022-09-11 NOTE — DISCHARGE PLANNING
Discharge Planning Assessment Post Partum     Reason for Referral: History of THC  Address: [address] [place] [address] [address] [facility] [place] [address]  Phone: [phone]  Type of Living Situation: living with FOB  Mom Diagnosis: Pregnancy,   Baby Diagnosis: [place]-39 weeks  Primary Language: English     Name of Baby: Qing Rockwell (: 9/10/22)  Father of the Baby: Jim Rockwell  Involved in baby’s care? Yes  Contact Information: [phone]     Prenatal Care: Yes-Dr. Sutton  Mom's PCP: No PCP   PCP for new baby: Pediatrician list provided     Support System: FOB  Coping/Bonding between mother & baby: Yes  Source of Feeding: breast feeding  Supplies for Infant: prepared for infant; denies any needs     Mom's Insurance: [employer] Medicaid  Baby Covered on Insurance:Yes  Mother Employed/School: [employer]  Other children in the home/names & ages: son-age 7 years     Financial Hardship/Income: No   Mom's Mental status: alert and oriented  Services used prior to admit: None     CPS History: No  Psychiatric History: No  Domestic Violence History: No  Drug/ETOH History: history of THC prior to pregnancy.  Infant's UDS is negative     Resources Provided: pediatrician list provided  Referrals Made: None      Clearance for Discharge: Infant is cleared to discharge home with parents once medically cleared

## 2022-09-11 NOTE — PROGRESS NOTES
"Received patient to room S-320 via gurney.  Pt moved herself over from gurney to bed. Report received from SHAY García and assumed care of patient.  Nursing assessment done.  She denies pain.  She was oriented to room, call light, infant security, emergency light, and unit routine.  Plan of care discussed.  Encouraged to call with any needs.   FOB at bedside and very supportive. Two infant bands and cuddle system tag number checked for accuracy with \" Raquel\", L&D RN when transferred to postpartum at this time.   "

## 2022-09-11 NOTE — CARE PLAN
The patient is Stable - Low risk of patient condition declining or worsening    Shift Goals  Clinical Goals: VSS, pain control  Patient Goals: rest, eat  Family Goals: support    Progress made toward(s) clinical / shift goals:  VSS, fundus firm, lochia light. Pt's pain has been well controlled without the need for PRN pain medications    Patient is not progressing towards the following goals:

## 2022-09-11 NOTE — LACTATION NOTE
This note was copied from a baby's chart.  27yr, , 39wk0d, experienced breastfeeding mom    Mom reports that baby is breastfeeding frequently and well and denies having any questions or concerns.  Mom has breast fed all three other children without  problems.      Offered to assist.  Mom denies needing assistance at this time.  Family in room and holding baby.  Baby is bundle wrapped and asleep.    Encouraged to call for lactation assistance today, if desired.  Recommended allowing baby to breastfeed frequently and with cues and at least 8-10 times every 24 hours.

## 2022-09-11 NOTE — PROGRESS NOTES
Progress Note    Subjective:   Doing well. She reports no further sig bleeding this am. She is doing well. Her pain is well controlled. She is breastfeeding well. She is ambulating and voiding well. She has passed flatus yesterday. She is not complaining of lightheadedness or fatigue.    Objective Data:  Recent Labs     09/10/22  0840 09/10/22  1403 09/11/22  0525   WBC 12.7* 17.7* 22.6*   RBC 3.28* 3.62* 2.80*   HEMOGLOBIN 8.8* 9.7* 7.5*   HEMATOCRIT 28.4* 31.8* 24.4*   MCV 86.6 87.8 87.1   MCH 26.8* 26.8* 26.8*   MCHC 31.0* 30.5* 30.7*   RDW 52.7* 53.1* 52.5*   PLATELETCT 210 228 225   MPV 11.5 11.7 11.8           Vitals:    09/11/22 0100 09/11/22 0236 09/11/22 0601 09/11/22 0612   BP:  105/67 (!) 83/53 (!) 99/61   Pulse: 96 86 86    Resp: 18 16 18    Temp:  36.4 °C (97.6 °F) 36.5 °C (97.7 °F)    TempSrc:  Temporal Temporal    SpO2: 97% 96% 100%    Weight:       Height:         Abdomen: soft non tender fundus with covered incision  Perineum: no sig bleeding or discharge this am on the pad    Intake/Output Summary (Last 24 hours) at 9/11/2022 0806  Last data filed at 9/10/2022 2200  Gross per 24 hour   Intake 3500 ml   Output 3133 ml   Net 367 ml       Current Facility-Administered Medications   Medication Dose Route Frequency Provider Last Rate Last Admin    acetaminophen (TYLENOL) tablet 1,000 mg  1,000 mg Oral Q6HR Lanre Sandoval M.D.   1,000 mg at 09/11/22 0505    ketorolac (TORADOL) injection 30 mg  30 mg Intravenous Q6HRS Lanre Sandoval M.D.   30 mg at 09/11/22 0504    oxyCODONE immediate-release (ROXICODONE) tablet 5 mg  5 mg Oral Q4HRS PRN Lanre Sandoval M.D.        oxyCODONE immediate release (ROXICODONE) tablet 10 mg  10 mg Oral Q4HRS PRN Lanre Sandoval M.D.        HYDROmorphone (Dilaudid) injection 0.2 mg  0.2 mg Intravenous Q2HRS PRN Lanre Sandoval M.D.        ondansetron (ZOFRAN) syringe/vial injection 4 mg  4 mg Intravenous Q6HRS PRN Lanre Sandoval M.D.        diphenhydrAMINE  (BENADRYL) injection 12.5 mg  12.5 mg Intravenous Q6HRS PRN Lanre Sandoval M.D.        diphenhydrAMINE (BENADRYL) injection 12.5 mg  12.5 mg Intravenous Q6HRS PRN Lanre Sandoval M.D.        Or    diphenhydrAMINE (BENADRYL) injection 25 mg  25 mg Intravenous Q6HRS PRN Lanre Sandoval M.D.        Or    naloxone HCl (NARCAN) 20 mg in  mL infusion  0.4 mg/hr Intravenous Q6HRS PRN Lanre Sandoval M.D.        lactated ringers infusion   Intravenous Continuous Lanre Sandoval M.D.        lactated ringers infusion   Intravenous PRN Sinan Sutton M.D.        ibuprofen (MOTRIN) tablet 800 mg  800 mg Oral Q8HRS Sinan Sutton M.D.        Followed by    [START ON 9/14/2022] ibuprofen (MOTRIN) tablet 800 mg  800 mg Oral Q8HRS PRN Sinan Sutton M.D.        acetaminophen (TYLENOL) tablet 1,000 mg  1,000 mg Oral Q6HRS Sinan Sutton M.D.        Followed by    [START ON 9/14/2022] acetaminophen (TYLENOL) tablet 1,000 mg  1,000 mg Oral Q6HRS PRN Sinan Sutton M.D.        oxyCODONE immediate-release (ROXICODONE) tablet 5 mg  5 mg Oral Q4HRS PRN Sinan Sutton M.D.        oxyCODONE immediate release (ROXICODONE) tablet 10 mg  10 mg Oral Q4HRS PRN Sinan Sutton M.D.        ondansetron (ZOFRAN) syringe/vial injection 4 mg  4 mg Intravenous Q6HRS PRN Sinan Sutton M.D.        Or    ondansetron (ZOFRAN ODT) dispertab 4 mg  4 mg Oral Q6HRS PRN Sinan Sutton M.D.        diphenhydrAMINE (BENADRYL) tablet/capsule 25 mg  25 mg Oral Q6HRS PRN Sinan Sutton M.D.        Or    diphenhydrAMINE (BENADRYL) injection 25 mg  25 mg Intravenous Q6HRS PRN Sinan Sutton M.D.        docusate sodium (COLACE) capsule 100 mg  100 mg Oral BID PRN Sinan Sutton M.D.   100 mg at 09/11/22 0505    tetanus-dipth-acell pertussis (Tdap) inj 0.5 mL  0.5 mL Intramuscular Once PRN Sinan Sutton M.D.        measles, mumps and rubella vaccine (MMR) injection 0.5 mL  0.5 mL Subcutaneous Once PRN Sinan Sutton M.D.            A/P S/P Repeat LTCS and BTS now POD/PPD #1. She is doing well after the initial bleeding noted after the delivery. She is asx with her anemia presently. She did have some low blood pressures this am but given she is asx will continue with hydration and observation. All questions were answered and she will proceed with otherwise usual pp management today.

## 2022-09-12 ENCOUNTER — PHARMACY VISIT (OUTPATIENT)
Dept: PHARMACY | Facility: MEDICAL CENTER | Age: 27
End: 2022-09-12
Payer: COMMERCIAL

## 2022-09-12 VITALS
DIASTOLIC BLOOD PRESSURE: 67 MMHG | BODY MASS INDEX: 33.12 KG/M2 | HEIGHT: 64 IN | RESPIRATION RATE: 17 BRPM | OXYGEN SATURATION: 98 % | WEIGHT: 194 LBS | SYSTOLIC BLOOD PRESSURE: 100 MMHG | TEMPERATURE: 96.8 F | HEART RATE: 78 BPM

## 2022-09-12 PROBLEM — Z30.2 ENCOUNTER FOR STERILIZATION: Status: ACTIVE | Noted: 2022-09-12

## 2022-09-12 PROBLEM — O99.013 MATERNAL ANEMIA IN PREGNANCY, ANTEPARTUM, THIRD TRIMESTER: Status: ACTIVE | Noted: 2022-09-12

## 2022-09-12 LAB
BASOPHILS # BLD AUTO: 0.2 % (ref 0–1.8)
BASOPHILS # BLD: 0.04 K/UL (ref 0–0.12)
EOSINOPHIL # BLD AUTO: 0.23 K/UL (ref 0–0.51)
EOSINOPHIL NFR BLD: 1.4 % (ref 0–6.9)
ERYTHROCYTE [DISTWIDTH] IN BLOOD BY AUTOMATED COUNT: 53.5 FL (ref 35.9–50)
HCT VFR BLD AUTO: 21.4 % (ref 37–47)
HGB BLD-MCNC: 6.5 G/DL (ref 12–16)
IMM GRANULOCYTES # BLD AUTO: 0.24 K/UL (ref 0–0.11)
IMM GRANULOCYTES NFR BLD AUTO: 1.4 % (ref 0–0.9)
LYMPHOCYTES # BLD AUTO: 2.87 K/UL (ref 1–4.8)
LYMPHOCYTES NFR BLD: 17.2 % (ref 22–41)
MCH RBC QN AUTO: 26.9 PG (ref 27–33)
MCHC RBC AUTO-ENTMCNC: 30.4 G/DL (ref 33.6–35)
MCV RBC AUTO: 88.4 FL (ref 81.4–97.8)
MONOCYTES # BLD AUTO: 1.34 K/UL (ref 0–0.85)
MONOCYTES NFR BLD AUTO: 8 % (ref 0–13.4)
NEUTROPHILS # BLD AUTO: 11.93 K/UL (ref 2–7.15)
NEUTROPHILS NFR BLD: 71.8 % (ref 44–72)
NRBC # BLD AUTO: 0.06 K/UL
NRBC BLD-RTO: 0.4 /100 WBC
PATHOLOGY CONSULT NOTE: NORMAL
PLATELET # BLD AUTO: 217 K/UL (ref 164–446)
PMV BLD AUTO: 11.7 FL (ref 9–12.9)
RBC # BLD AUTO: 2.42 M/UL (ref 4.2–5.4)
WBC # BLD AUTO: 16.7 K/UL (ref 4.8–10.8)

## 2022-09-12 PROCEDURE — 85025 COMPLETE CBC W/AUTO DIFF WBC: CPT

## 2022-09-12 PROCEDURE — 700102 HCHG RX REV CODE 250 W/ 637 OVERRIDE(OP): Performed by: SPECIALIST

## 2022-09-12 PROCEDURE — RXMED WILLOW AMBULATORY MEDICATION CHARGE: Performed by: SPECIALIST

## 2022-09-12 PROCEDURE — 36415 COLL VENOUS BLD VENIPUNCTURE: CPT

## 2022-09-12 PROCEDURE — A9270 NON-COVERED ITEM OR SERVICE: HCPCS | Performed by: SPECIALIST

## 2022-09-12 RX ORDER — OXYCODONE HYDROCHLORIDE AND ACETAMINOPHEN 5; 325 MG/1; MG/1
1 TABLET ORAL EVERY 6 HOURS PRN
Qty: 28 TABLET | Refills: 0 | Status: SHIPPED | OUTPATIENT
Start: 2022-09-12 | End: 2022-09-19

## 2022-09-12 RX ORDER — DOCUSATE SODIUM 100 MG/1
100 CAPSULE, LIQUID FILLED ORAL 2 TIMES DAILY
Qty: 60 CAPSULE | Refills: 0 | Status: SHIPPED | OUTPATIENT
Start: 2022-09-12

## 2022-09-12 RX ORDER — IBUPROFEN 800 MG/1
800 TABLET ORAL EVERY 8 HOURS PRN
Qty: 30 TABLET | Refills: 0 | Status: SHIPPED | OUTPATIENT
Start: 2022-09-12

## 2022-09-12 RX ORDER — FERROUS SULFATE 325(65) MG
325 TABLET ORAL DAILY
Qty: 30 TABLET | Refills: 0 | Status: SHIPPED | OUTPATIENT
Start: 2022-09-12

## 2022-09-12 RX ADMIN — ACETAMINOPHEN 1000 MG: 500 TABLET ORAL at 06:03

## 2022-09-12 RX ADMIN — IBUPROFEN 800 MG: 800 TABLET, FILM COATED ORAL at 04:33

## 2022-09-12 RX ADMIN — ACETAMINOPHEN 1000 MG: 500 TABLET ORAL at 00:07

## 2022-09-12 RX ADMIN — OXYCODONE HYDROCHLORIDE 10 MG: 10 TABLET ORAL at 06:03

## 2022-09-12 RX ADMIN — OXYCODONE HYDROCHLORIDE 10 MG: 10 TABLET ORAL at 02:14

## 2022-09-12 ASSESSMENT — EDINBURGH POSTNATAL DEPRESSION SCALE (EPDS)
THE THOUGHT OF HARMING MYSELF HAS OCCURRED TO ME: NEVER
I HAVE LOOKED FORWARD WITH ENJOYMENT TO THINGS: AS MUCH AS I EVER DID
I HAVE FELT SAD OR MISERABLE: NO, NOT AT ALL
I HAVE BEEN ABLE TO LAUGH AND SEE THE FUNNY SIDE OF THINGS: AS MUCH AS I ALWAYS COULD
I HAVE BEEN ANXIOUS OR WORRIED FOR NO GOOD REASON: NO, NOT AT ALL
THINGS HAVE BEEN GETTING ON TOP OF ME: NO, I HAVE BEEN COPING AS WELL AS EVER
I HAVE BLAMED MYSELF UNNECESSARILY WHEN THINGS WENT WRONG: NOT VERY OFTEN
I HAVE FELT SCARED OR PANICKY FOR NO GOOD REASON: NO, NOT AT ALL
I HAVE BEEN SO UNHAPPY THAT I HAVE BEEN CRYING: NO, NEVER
I HAVE BEEN SO UNHAPPY THAT I HAVE HAD DIFFICULTY SLEEPING: NOT AT ALL

## 2022-09-12 NOTE — PROGRESS NOTES
The patient is today postoperative day #2 status post repeat low transverse  section and bilateral salpingectomy.  She tells me this morning that she feels fine and that she has no problems or complaints other than for mild abdominal soreness.  She says that her vaginal bleeding is minimal to absent.  She tells me she is ambulating and urinating and tolerating a regular diet.  She tells me this morning that she would like to go home today.  Vital signs: The patient's vital signs are stable and she is afebrile.  General: The patient appears well-developed and well-nourished and relaxed and alert and comfortable and in no apparent distress.  Labs: The patient's antepartum hemoglobin was 8.8 g/dL and her postoperative hemoglobin on postoperative day #0 was 9.7 g/dL and her postoperative hemoglobin on postoperative day #1 (yesterday) was 7.5 g/dL and her postoperative hemoglobin today (postoperative day #2) was 6.5 g/dL.  Her platelet count this morning is normal at 217,000.  Of note her PT, PTT, and INR performed within a few hours following her  section were normal with an INR of 0.99 and a PT of 13.0 seconds.  Assessment:  The patient is today postoperative day #2 status post repeat low transverse  section and bilateral salpingectomy.  She appears to be recovering well from her recent procedure.  Anemia.  The patient was anemic preoperatively and this does appear to be a chronic anemia.  She has been anemic postoperatively and her postoperative hemoglobin yesterday was 7.5 g/dL and her postoperative hemoglobin this morning is 6.5 g/dL.  However, she appears to be asymptomatic.  The patient would like to go home today.  Plan:  I explained to the patient that we can discharge her home today and I explained to her that her blood work does indeed reveal anemia and that this anemia is significant but that given the fact that her anemia is chronic and that she is asymptomatic that we do not  necessarily need to proceed with blood transfusion.  She acknowledged this.  I explained to her that given the fact that her anemia is chronic and that she is asymptomatic that we could send her home if she would like.  She says she very much wishes to go home today.  I explained to her that she will need to take oral iron every day for at least the next month because of her anemia and she acknowledged this and that she will also because of her anemia and need to take (continue to take) her prenatal vitamins daily for the next month and she acknowledged this as well.  I explained to her that I will send in prescriptions for oral iron and also stool softeners to prevent constipation while she is taking the iron.  I explained to her that I will also send her home with prescriptions for analgesics namely high-dose ibuprofen and Percocet.  She acknowledged this to.  I explained to her that we will in the future need to send her back to the lab for another CBC to monitor her anemia and she acknowledged this.  I asked her to return to see me in the office in 2 weeks for a postoperative visit and to call or contact me at any time should she ever have any problems or questions or complaints and she said that she would do so.  Sinan Sutton MD

## 2022-09-12 NOTE — LACTATION NOTE
Mother reports that she is breastfeeding her  without difficulty or discomfort. Resources for out-patient support discussed.

## 2022-09-12 NOTE — PROGRESS NOTES
Report received from Rosemarie DAY. Reviewed POC with the pt including scheduled and PRN pain medication. Pt is breastfeeding baby independently. Call light is within reach. Pt advised to call with needs at any time.

## 2022-09-12 NOTE — CARE PLAN
Problem: Knowledge Deficit - Postpartum  Goal: Patient will verbalize and demonstrate understanding of self and infant care  Outcome: Progressing     Problem: Psychosocial - Postpartum  Goal: Patient will verbalize and demonstrate effective bonding and parenting behavior  Outcome: Progressing     Problem: Altered Physiologic Condition  Goal: Patient physiologically stable as evidenced by normal lochia, palpable uterine involution and vitals within normal limits  Outcome: Progressing   The patient is Stable - Low risk of patient condition declining or worsening    Shift Goals  Clinical Goals: maintain tolerable pain level  Patient Goals: rest, eat  Family Goals: support    Progress made toward(s) clinical / shift goals:  Pt has been caring for self and baby. Pt is breastfeeding baby on demand. Pt pain has been managed with scheduled and PRN pain medication.     Patient is not progressing towards the following goals:

## 2022-09-12 NOTE — PROGRESS NOTES
H/H results 6.5/21.4 reported to Dr. Sutton. Pt has no c/o headache or dizziness. Dr. Sutton states he will be by to round on this pt this morning.

## 2022-09-12 NOTE — PROGRESS NOTES
Discussed discharge instructions including baby's discharge. Emphasized the importance of the  screen test. All questions have been answered. Pharmacist delivered her medication for home. Family provided car seat for baby.

## 2022-09-12 NOTE — PROGRESS NOTES
Report received from Loida DAY. Assumed care. Patient's awake and alert. She would like to go home today. She denies being lightheaded and dyspnea. She claimed to be ambulating well and aware of what to report to doctor regarding symptoms of anemia. Assessment done. Discussed plan of care to patient.

## 2022-09-12 NOTE — DISCHARGE PLANNING
Meds-to-Beds: Discharge prescription orders listed below delivered to patient's bedside. SHAY Vazquez notified. Patient counseled.      Current Outpatient Medications   Medication Sig Dispense Refill    oxyCODONE-acetaminophen (PERCOCET) 5-325 MG Tab Take 1 Tablet by mouth every 6 hours as needed for Moderate Pain or Severe Pain for up to 7 days. 28 Tablet 0    ibuprofen (MOTRIN) 800 MG Tab Take 1 Tablet by mouth every 8 hours as needed for Mild Pain or Moderate Pain. 30 Tablet 0    ferrous sulfate 325 (65 Fe) MG tablet Take 1 Tablet by mouth every day. 30 Tablet 0    docusate sodium (COLACE) 100 MG Cap Take 1 Capsule by mouth 2 times a day. 60 Capsule 0      Dominique Bishop, PharmD

## 2022-09-12 NOTE — DISCHARGE INSTRUCTIONS
PATIENT DISCHARGE EDUCATION INSTRUCTION SHEET    REASONS TO CALL YOUR OBSTETRICIAN  Persistent fever, shaking, chills (Temperature higher than 100.4) may indicate you have an infection  Heavy bleeding: soaking more than 1 pad per hour; Passing clots an egg-sized clot or bigger may mean you have an postpartum hemorrhage  Foul odor from vagina or bad smelling or discolored discharge or blood  Breast infection (Mastitis symptoms); breast pain, chills, fever, redness or red streaks, may feel flu like symptoms  Urinary pain, burning or frequency  Incision that is not healing, increased redness, swelling, tenderness or pain, or any pus from episiotomy or  site may mean you have an infection  Redness, swelling, warmth, or painful to touch in the calf area of your leg may mean you have a blood clot  Severe or intensified depression, thoughts or feelings of wanting to hurt yourself or someone else   Pain in chest, obstructed breathing or shortness of breath (trouble catching your breath) may mean you are having a postpartum complication. Call your provider immediately   Headache that does not get better, even after taking medicine, a bad headache with vision changes or pain in the upper right area of your belly may mean you have high blood pressure or post birth preeclampsia. Call your provider immediately    HAND WASHING  All family and friends should wash their hands:  Before and after holding the baby  Before feeding the baby  After using the restroom or changing the baby's diaper    WOUND CARE  Ask your physician for additional care instructions. In general:   Incision:  May shower and pat incision dry   Keep the incision clean and dry  There should not be any opening or pus from the incision  Continue to walk at home 3 times a day   Do NOT lift anything heavier than your baby (over 10 pounds)  Encourage family to help participate in care of the  to allow rest and mom time to heal  Continue to  "use john-bottle until bleeding stops as needed    VAGINAL CARE AND BLEEDING  Nothing inside vagina for 6 weeks:   No sexual intercourse, tampons or douching  Bleeding may continue for 2-4 weeks. Amount and color may vary  Soaking 1 pad or more in an hour for several hours is considered heavy bleeding  Passing large egg sized blood clots can be concerning  If you feel like you have heavy bleeding or are having increasing amount of blood clots call your Obstetrician immediately  If you begin feeling faint upon standing, feeling sick to your stomach, have clammy skin, a really fast heartbeat, have chills, start feeling confused, dizzy, sleepy or weak, or feeling like you're going to faint call your Obstetrician immediately    HYPERTENSION   Preeclampsia or gestational hypertension are types of high blood pressure that only pregnant women can get. It is important for you to be aware of symptoms to seek early intervention and treatment. If you have any of these symptoms immediately call your Obstetrician    Vision changes or blurred vision   Severe headache or pain that is unrelieved with medication and will not go away  Persistent pain in upper abdomen or shoulder   Increased swelling of face, feet, or hands  Difficulty breathing or shortness of breath at rest  Urinating less than usual    URINATION AND BOWEL MOVEMENTS  Eating more fiber (bran cereal, fruits, and vegetables) and drinking plenty of fluids will help to avoid constipation  Urinary frequency and urgency after childbirth is normal  If you experience any urinary pain, burning or frequency call your provider    BIRTH CONTROL  It is possible to become pregnant at any time after delivery and while breastfeeding  Plan to discuss a method of birth control with your physician at your post delivery follow up visit    POSTPARTUM BLUES  During the first few days after birth, you may experience a sense of the \"blues\" which may include impatience, irritability or even " "crying. These feelings come and go quickly. However, as many as 1 in 10 women experience emotional symptoms known as postpartum depression.     POSTPARTUM DEPRESSION    May start as early as the second or third day after delivery or take several weeks or months to develop. Symptoms of \"blues\" are present, but are more intense: Crying spells; loss of appetite; feelings of hopelessness or loss of control; fear of touching the baby; over concern or no concern at all about the baby; little or no concern about your own appearance/caring for yourself; and/or inability to sleep or excessive sleeping. Contact your Obstetrician if you are experiencing any of these symptoms     PREVENTING SHAKEN BABY  If you are angry or stressed, PUT THE BABY IN THE CRIB, step away, take some deep breaths, and wait until you are calm to care for the baby. DO NOT SHAKE THE BABY. You are not alone, call a supporter for help.  [facility] 24/7 crisis call line (854-931-6747) or (1-232.630.5403)  You can also text them, text \"ANSWER\" (271660)  " English

## 2022-09-19 NOTE — DISCHARGE SUMMARY
DATE OF ADMISSION:  09/10/2022   DATE OF DISCHARGE:  2022     ADMISSION DIAGNOSES:  1.  Intrauterine pregnancy at 39 weeks' gestation with good dates.  2.  Previous  section x2 and the patient wishes to be delivered by a   repeat  section.  3.  Multiparity and the patient desires permanent tubal sterilization.     DISCHARGE DIAGNOSES:    1.  Intrauterine pregnancy at 39 weeks' gestation with good dates.  2.  Previous  section x2 and the patient wishes to be delivered by a   repeat  section.  3.  Multiparity and the patient desires permanent tubal sterilization.  4.  Maternal anemia.  5.  Live term female  with Apgar scores of 8 and 9 at 1 and 5 minutes   respectively and a  weight of 4660 grams indicating fetal macrosomia.     PROCEDURES:  Repeat low transverse  section with bilateral   salpingectomy.     COMPLICATIONS:  None.     HOSPITAL COURSE:  The patient was admitted to [facility]   Labor and Delivery on 09/10/2022 with the aforementioned diagnoses and on that   day underwent a repeat low transverse  section, bilateral   salpingectomy, and was delivered of a live term female  with Apgar   scores of 8 and 8 at 1 and 5 minutes respectively and a  weight of 4660   grams.  The procedure was without complications.  The patient did have a   greater than average uterine bleeding in the postanesthesia recovery unit and   I saw and visited with the patient and gave orders for Methergine and   Hemabate, which was given as well as orders for stat CBC and PT, PTT and INR.    Her INR was normal.  Her bleeding normalized and her hemoglobin was found to   change from 8.8 g/dL preoperatively and immediately postoperatively actually   her hemoglobin increased to 9.7 g/dL, but then the next day, namely   postoperative day #1, her hemoglobin was 7.5 g/dL and then on postoperative   day #2, her hemoglobin was 6.5 g/dL.  However,  her anemia was asymptomatic.    On postoperative day #2, the patient said that she felt fine and had no   problems or complaints other than for mild abdominal soreness and told me on   postoperative day #2 that her vaginal bleeding was minimal to absent and told   me that she was ambulating and urinating and tolerating a regular diet and   said she wanted to go home on that day, namely postoperative day #2. Her vital   signs were stable and she was afebrile and she appeared well developed and   well nourished and relaxed and alert and comfortable and in no apparent   distress and she was discharged home on that day, namely postoperative day #2   with prescriptions for Percocet and ibuprofen, iron sulfate and stool   softeners and I asked her to follow up with me in the office in 2 weeks for   postoperative visit and to call or contact me at any time should she ever have   any problems or questions or complaints and she said that she would do so.        ______________________________  MD LUMA Montano/MITCHELL/NIT    DD:  09/18/2022 15:22  DT:  09/18/2022 20:13    Job#:  [id number]

## 2024-06-06 ENCOUNTER — NON-PROVIDER VISIT (OUTPATIENT)
Dept: URGENT CARE | Facility: PHYSICIAN GROUP | Age: 29
End: 2024-06-06

## 2024-06-06 DIAGNOSIS — Z11.1 PPD SCREENING TEST: Primary | ICD-10-CM

## 2024-06-06 PROCEDURE — 86580 TB INTRADERMAL TEST: CPT

## 2024-06-06 NOTE — PROGRESS NOTES
Jessica Hoff is a 29 y.o. female here for a non-provider visit for PPD placement -- Step 1 of 1    Reason for PPD:  work requirement    1. TB evaluation questionnaire completed by patient? Yes      -  If any answers marked yes did you contact a provider prior to placing? Not Indicated  2.  Patient notified to return to clinic for reading on: Saturday 6.8.24 after 1240 or before 1240 on Sunday 6.9.24   3.  PPD Placement documentation completed on TB evaluation questionnaire? Yes  4.  [place] [facility] [place]

## 2024-06-08 ENCOUNTER — NON-PROVIDER VISIT (OUTPATIENT)
Dept: URGENT CARE | Facility: PHYSICIAN GROUP | Age: 29
End: 2024-06-08

## 2024-06-08 LAB — TB WHEAL 3D P 5 TU DIAM: NORMAL MM

## 2024-06-08 NOTE — PROGRESS NOTES
Jessicafco Hoff is a 29 y.o. female here for a non-provider visit for PPD reading -- Step 1 of 1.      1.  Resulted in Epic under enter/edit results? Yes   2.  TB evaluation questionnaire scanned into chart and original given to patient?Yes      3. Was induration greater than 0 mm? No.      Routed to PCP? No

## (undated) DEVICE — HEAD HOLDER JUNIOR/ADULT

## (undated) DEVICE — SUTURE 3-0 VICRYL PLUS CT-1 - 36 INCH (36/BX)

## (undated) DEVICE — CATHETER IV NON-SAFETY 18 GA X 1 1/4 (50/BX 4BX/CA)

## (undated) DEVICE — SLEEVE, SEQUENTIAL CALF REG

## (undated) DEVICE — CANISTER SUCTION 3000ML MECHANICAL FILTER AUTO SHUTOFF MEDI-VAC NONSTERILE LF DISP  (40EA/CA)

## (undated) DEVICE — TRAY SPINAL ANESTHESIA NON-SAFETY (10/CA)

## (undated) DEVICE — GLOVE BIOGEL SZ 7 SURGICAL PF LTX - (50PR/BX 4BX/CA)

## (undated) DEVICE — GLOVE BIOGEL SZ 7.5 SURGICAL PF LTX - (50PR/BX 4BX/CA)

## (undated) DEVICE — SUTURE 4-0 VICRYL PLUSFS-1 - 27 INCH (36/BX)

## (undated) DEVICE — CLOSURE SKIN STRIP 1/2 X 4 IN - (STERI STRIP) (50/BX 4BX/CA)

## (undated) DEVICE — CHLORAPREP 26 ML APPLICATOR - ORANGE TINT(25/CA)

## (undated) DEVICE — TAPE CLOTH MEDIPORE 6 INCH - (12RL/CA)

## (undated) DEVICE — SODIUM CHL IRRIGATION 0.9% 1000ML (12EA/CA)

## (undated) DEVICE — SUTURE 2-0 CHROMIC GUT CT-1 27 (36PK/BX)"

## (undated) DEVICE — PACK C-SECTION (2EA/CA)

## (undated) DEVICE — PAD GROUNDING PRE-JELLED - (50EA/PK)

## (undated) DEVICE — TUBING CLEARLINK DUO-VENT - C-FLO (48EA/CA)

## (undated) DEVICE — KIT  I.V. START (100EA/CA)

## (undated) DEVICE — BLANKET UNDERBODY FULL ACCES - (5/CA)

## (undated) DEVICE — SET EXTENSION WITH 2 PORTS (48EA/CA) ***PART #2C8610 IS A SUBSTITUTE*****

## (undated) DEVICE — WATER IRRIGATION STERILE 1000ML (12EA/CA)

## (undated) DEVICE — PACK ROOM TURNOVER L&D (12/CA)

## (undated) DEVICE — SUTURE 0 VICRYL PLUS CT-1 - 36 INCH (36/BX)

## (undated) DEVICE — RETRACTOR O C SECTION LRY - (5/BX)

## (undated) DEVICE — SUTURE 1 VICRYL PLUS CTX - 36 INCH (36/BX)

## (undated) DEVICE — DRESSING POST OP BORDER 4 X 10 (5EA/BX)

## (undated) DEVICE — DETERGENT RENUZYME PLUS 10 OZ PACKET (50/BX)

## (undated) DEVICE — SUTURE 1 CHROMIC CTX ETHICON - (36PK/BX)

## (undated) DEVICE — GLOVE BIOGEL INDICATOR SZ 7SURGICAL PF LTX - (50/BX 4BX/CA)

## (undated) DEVICE — SUTURE 3-0 MONOCRYL PLUS PS-1 - 27 INCH (36/BX)

## (undated) DEVICE — STAPLER SKIN DISP - (6/BX 10BX/CA) VISISTAT